# Patient Record
Sex: MALE | Race: WHITE | Employment: OTHER | URBAN - METROPOLITAN AREA
[De-identification: names, ages, dates, MRNs, and addresses within clinical notes are randomized per-mention and may not be internally consistent; named-entity substitution may affect disease eponyms.]

---

## 2017-01-11 ENCOUNTER — INITIAL CONSULT (OUTPATIENT)
Dept: PULMONOLOGY | Age: 77
End: 2017-01-11

## 2017-01-11 VITALS
SYSTOLIC BLOOD PRESSURE: 120 MMHG | BODY MASS INDEX: 20.86 KG/M2 | HEIGHT: 71 IN | HEART RATE: 62 BPM | WEIGHT: 149 LBS | OXYGEN SATURATION: 95 % | RESPIRATION RATE: 20 BRPM | DIASTOLIC BLOOD PRESSURE: 80 MMHG

## 2017-01-11 DIAGNOSIS — J45.30 MILD PERSISTENT ASTHMA WITHOUT COMPLICATION: ICD-10-CM

## 2017-01-11 LAB
DLCO %PRED: NORMAL
DLCO PRE: NORMAL
FEF 25-75%-POST: 2.61
FEF 25-75%-PRE: 2.66
FEV1-POST: 3
FEV1-PRE: 3.06
FEV1/FVC-POST: 76.8
FEV1/FVC-PRE: 76.7
FVC-POST: 3.91
FVC-PRE: 3.99
MEP: NORMAL
MIP: NORMAL
TLC %PRED: NORMAL
TLC PRE: NORMAL

## 2017-01-11 PROCEDURE — 99204 OFFICE O/P NEW MOD 45 MIN: CPT | Performed by: INTERNAL MEDICINE

## 2017-01-11 PROCEDURE — 94060 EVALUATION OF WHEEZING: CPT | Performed by: INTERNAL MEDICINE

## 2017-01-11 RX ORDER — ORPHENADRINE CITRATE 100 MG/1
TABLET, EXTENDED RELEASE ORAL
COMMUNITY
Start: 2016-11-28 | End: 2017-02-08 | Stop reason: ALTCHOICE

## 2017-01-11 RX ORDER — HYDROCODONE POLISTIREX AND CHLORPHENIRAMINE POLISTIREX 10; 8 MG/5ML; MG/5ML
SUSPENSION, EXTENDED RELEASE ORAL
COMMUNITY
Start: 2016-11-09 | End: 2018-01-04

## 2017-01-11 RX ORDER — ACETAMINOPHEN 500 MG
500 TABLET ORAL EVERY 6 HOURS PRN
COMMUNITY

## 2017-01-11 ASSESSMENT — PULMONARY FUNCTION TESTS
FVC_PRE: 3.99
FEV1_POST: 3.00
FEV1_PRE: 3.06
FEV1/FVC_PRE: 76.7
FVC_POST: 3.91
FEV1/FVC_POST: 76.8

## 2017-01-30 ENCOUNTER — HOSPITAL ENCOUNTER (OUTPATIENT)
Dept: LAB | Age: 77
Discharge: HOME | End: 2017-01-30
Attending: INTERNAL MEDICINE | Admitting: INTERNAL MEDICINE

## 2017-01-30 LAB
BASOPHILS ABSOLUTE: 0 K/CU MM
BASOPHILS RELATIVE PERCENT: 0.2 % (ref 0–1)
DIFFERENTIAL TYPE: ABNORMAL
EOSINOPHILS ABSOLUTE: 0.1 K/CU MM
EOSINOPHILS RELATIVE PERCENT: 1.1 % (ref 0–3)
FERRITIN: 554 NG/ML (ref 30–400)
HCT VFR BLD CALC: 42 % (ref 42–52)
HEMOGLOBIN: 14.3 GM/DL (ref 13.5–18)
IMMATURE NEUTROPHIL %: 0.5 % (ref 0–0.43)
LYMPHOCYTES ABSOLUTE: 1.1 K/CU MM
LYMPHOCYTES RELATIVE PERCENT: 13.2 % (ref 24–44)
MCH RBC QN AUTO: 31.4 PG (ref 27–31)
MCHC RBC AUTO-ENTMCNC: 34 % (ref 32–36)
MCV RBC AUTO: 92.1 FL (ref 78–100)
MONOCYTES ABSOLUTE: 0.7 K/CU MM
MONOCYTES RELATIVE PERCENT: 8.4 % (ref 0–4)
NUCLEATED RBC %: 0 %
PDW BLD-RTO: 13.2 % (ref 11.7–14.9)
PLATELET # BLD: 241 K/CU MM (ref 140–440)
PMV BLD AUTO: 9 FL (ref 7.5–11.1)
RBC # BLD: 4.56 M/CU MM (ref 4.6–6.2)
SEGMENTED NEUTROPHILS ABSOLUTE COUNT: 6.2 K/CU MM
SEGMENTED NEUTROPHILS RELATIVE PERCENT: 76.6 % (ref 36–66)
TOTAL IMMATURE NEUTOROPHIL: 0.04 K/CU MM
TOTAL NUCLEATED RBC: 0 K/CU MM
WBC # BLD: 8.1 K/CU MM (ref 4–10.5)

## 2017-02-01 ENCOUNTER — HOSPITAL ENCOUNTER (OUTPATIENT)
Dept: INFUSION THERAPY | Age: 77
Discharge: HOME | End: 2017-02-22
Attending: INTERNAL MEDICINE | Admitting: INTERNAL MEDICINE

## 2017-02-01 VITALS — SYSTOLIC BLOOD PRESSURE: 126 MMHG | RESPIRATION RATE: 16 BRPM | HEART RATE: 64 BPM | DIASTOLIC BLOOD PRESSURE: 67 MMHG

## 2017-02-01 RX ORDER — AMMONIA INHALANTS 0.04 G/.3ML
INHALANT RESPIRATORY (INHALATION)
Status: DISPENSED
Start: 2017-02-01 | End: 2017-02-01

## 2017-02-01 RX ORDER — ROSUVASTATIN CALCIUM 10 MG/1
10 TABLET, COATED ORAL DAILY
COMMUNITY

## 2017-02-03 ENCOUNTER — HOSPITAL ENCOUNTER (OUTPATIENT)
Dept: CT IMAGING | Age: 77
Discharge: HOME | End: 2017-02-03
Admitting: RADIOLOGY

## 2017-02-03 DIAGNOSIS — M54.50 LOW BACK PAIN: ICD-10-CM

## 2017-02-03 DIAGNOSIS — M54.40 ACUTE RIGHT-SIDED LOW BACK PAIN WITH SCIATICA, SCIATICA LATERALITY UNSPECIFIED: ICD-10-CM

## 2017-02-07 ENCOUNTER — HOSPITAL ENCOUNTER (OUTPATIENT)
Dept: LAB | Age: 77
Discharge: HOME | End: 2017-02-28
Attending: INTERNAL MEDICINE | Admitting: INTERNAL MEDICINE

## 2017-02-07 LAB
FERRITIN: 563 NG/ML (ref 30–400)
HEMOGLOBIN: 13.7 GM/DL (ref 13.5–18)

## 2017-02-08 ENCOUNTER — HOSPITAL ENCOUNTER (OUTPATIENT)
Dept: INFUSION THERAPY | Age: 77
Discharge: HOME | End: 2017-02-08
Attending: INTERNAL MEDICINE | Admitting: INTERNAL MEDICINE

## 2017-02-08 VITALS
TEMPERATURE: 97.8 F | SYSTOLIC BLOOD PRESSURE: 118 MMHG | BODY MASS INDEX: 21.7 KG/M2 | HEIGHT: 71 IN | HEART RATE: 70 BPM | OXYGEN SATURATION: 97 % | WEIGHT: 155 LBS | RESPIRATION RATE: 16 BRPM | DIASTOLIC BLOOD PRESSURE: 65 MMHG

## 2017-02-14 LAB
FERRITIN: 473 NG/ML (ref 30–400)
HEMOGLOBIN: 13.2 GM/DL (ref 13.5–18)

## 2017-02-20 LAB
FERRITIN: 322 NG/ML (ref 30–400)
HEMOGLOBIN: 11.6 GM/DL (ref 13.5–18)

## 2017-02-22 ENCOUNTER — HOSPITAL ENCOUNTER (OUTPATIENT)
Dept: INFUSION THERAPY | Age: 77
Discharge: HOME | End: 2017-02-22
Attending: INTERNAL MEDICINE | Admitting: INTERNAL MEDICINE

## 2017-02-24 ENCOUNTER — HOSPITAL ENCOUNTER (OUTPATIENT)
Dept: GENERAL RADIOLOGY | Age: 77
Discharge: HOME | End: 2017-02-24
Attending: INTERNAL MEDICINE | Admitting: INTERNAL MEDICINE

## 2017-02-24 LAB
ANION GAP SERPL CALCULATED.3IONS-SCNC: 15 MMOL/L (ref 4–16)
APTT: 37.3 SECONDS (ref 21.2–33)
BASOPHILS ABSOLUTE: 0 K/CU MM
BASOPHILS RELATIVE PERCENT: 0.3 % (ref 0–1)
BUN BLDV-MCNC: 17 MG/DL (ref 6–23)
CALCIUM SERPL-MCNC: 9.6 MG/DL (ref 8.3–10.6)
CHLORIDE BLD-SCNC: 96 MMOL/L (ref 99–110)
CO2: 27 MMOL/L (ref 21–32)
CREAT SERPL-MCNC: 1.1 MG/DL (ref 0.9–1.3)
DIFFERENTIAL TYPE: ABNORMAL
EOSINOPHILS ABSOLUTE: 0.1 K/CU MM
EOSINOPHILS RELATIVE PERCENT: 1.6 % (ref 0–3)
GFR AFRICAN AMERICAN: >60 ML/MIN/1.73M2
GFR NON-AFRICAN AMERICAN: >60 ML/MIN/1.73M2
GLUCOSE BLD-MCNC: 86 MG/DL (ref 70–140)
HCT VFR BLD CALC: 38 % (ref 42–52)
HEMOGLOBIN: 12.1 GM/DL (ref 13.5–18)
IMMATURE NEUTROPHIL %: 0.4 % (ref 0–0.43)
INR BLD: 1.49 INDEX
LYMPHOCYTES ABSOLUTE: 0.9 K/CU MM
LYMPHOCYTES RELATIVE PERCENT: 12.6 % (ref 24–44)
MCH RBC QN AUTO: 31 PG (ref 27–31)
MCHC RBC AUTO-ENTMCNC: 31.8 % (ref 32–36)
MCV RBC AUTO: 97.4 FL (ref 78–100)
MONOCYTES ABSOLUTE: 0.7 K/CU MM
MONOCYTES RELATIVE PERCENT: 10.1 % (ref 0–4)
NUCLEATED RBC %: 0 %
PDW BLD-RTO: 13.4 % (ref 11.7–14.9)
PLATELET # BLD: 255 K/CU MM (ref 140–440)
PMV BLD AUTO: 9.3 FL (ref 7.5–11.1)
POTASSIUM SERPL-SCNC: 3.4 MMOL/L (ref 3.5–5.1)
PROTHROMBIN TIME: 17.2 SECONDS (ref 9.12–12.5)
RBC # BLD: 3.9 M/CU MM (ref 4.6–6.2)
SEGMENTED NEUTROPHILS ABSOLUTE COUNT: 5.1 K/CU MM
SEGMENTED NEUTROPHILS RELATIVE PERCENT: 75 % (ref 36–66)
SODIUM BLD-SCNC: 138 MMOL/L (ref 135–145)
TOTAL IMMATURE NEUTOROPHIL: 0.03 K/CU MM
TOTAL NUCLEATED RBC: 0 K/CU MM
WBC # BLD: 6.8 K/CU MM (ref 4–10.5)

## 2017-02-28 PROBLEM — R00.1 BRADYCARDIA: Status: ACTIVE | Noted: 2017-02-28

## 2017-02-28 PROBLEM — I20.9 ISCHEMIC CHEST PAIN (HCC): Status: ACTIVE | Noted: 2017-02-28

## 2017-03-01 ENCOUNTER — HOSPITAL ENCOUNTER (OUTPATIENT)
Dept: LAB | Age: 77
Discharge: HOME | End: 2017-03-31
Attending: INTERNAL MEDICINE | Admitting: INTERNAL MEDICINE

## 2017-03-08 ENCOUNTER — HOSPITAL ENCOUNTER (OUTPATIENT)
Dept: INFUSION THERAPY | Age: 77
Discharge: HOME | End: 2017-04-06
Attending: INTERNAL MEDICINE | Admitting: INTERNAL MEDICINE

## 2017-03-24 ENCOUNTER — HOSPITAL ENCOUNTER (OUTPATIENT)
Dept: OTHER | Age: 77
Discharge: HOME | End: 2017-03-31
Attending: FAMILY MEDICINE | Admitting: INTERNAL MEDICINE

## 2017-04-01 ENCOUNTER — HOSPITAL ENCOUNTER (OUTPATIENT)
Dept: OTHER | Age: 77
Discharge: HOME | End: 2017-04-30
Attending: FAMILY MEDICINE | Admitting: INTERNAL MEDICINE

## 2017-05-01 ENCOUNTER — HOSPITAL ENCOUNTER (OUTPATIENT)
Dept: OTHER | Age: 77
Discharge: HOME | End: 2017-05-31
Attending: FAMILY MEDICINE | Admitting: INTERNAL MEDICINE

## 2017-05-23 ENCOUNTER — TELEPHONE (OUTPATIENT)
Dept: GASTROENTEROLOGY | Age: 77
End: 2017-05-23

## 2017-05-24 RX ORDER — RANITIDINE 150 MG/1
300 TABLET ORAL NIGHTLY
Qty: 180 TABLET | Refills: 3 | Status: SHIPPED | OUTPATIENT
Start: 2017-05-24

## 2017-06-05 ENCOUNTER — HOSPITAL ENCOUNTER (OUTPATIENT)
Dept: OTHER | Age: 77
Discharge: HOME | End: 2017-06-30
Attending: INTERNAL MEDICINE | Admitting: INTERNAL MEDICINE

## 2017-07-01 ENCOUNTER — HOSPITAL ENCOUNTER (OUTPATIENT)
Dept: OTHER | Age: 77
Discharge: HOME | End: 2017-07-31
Attending: INTERNAL MEDICINE | Admitting: INTERNAL MEDICINE

## 2017-07-17 ENCOUNTER — OFFICE VISIT (OUTPATIENT)
Dept: PULMONOLOGY | Age: 77
End: 2017-07-17

## 2017-07-17 VITALS
WEIGHT: 154 LBS | RESPIRATION RATE: 18 BRPM | SYSTOLIC BLOOD PRESSURE: 126 MMHG | OXYGEN SATURATION: 98 % | BODY MASS INDEX: 21.56 KG/M2 | HEART RATE: 67 BPM | HEIGHT: 71 IN | DIASTOLIC BLOOD PRESSURE: 80 MMHG

## 2017-07-17 DIAGNOSIS — J45.30 MILD PERSISTENT ASTHMA WITHOUT COMPLICATION: Primary | ICD-10-CM

## 2017-07-17 PROCEDURE — 99213 OFFICE O/P EST LOW 20 MIN: CPT | Performed by: INTERNAL MEDICINE

## 2017-07-17 PROCEDURE — G8420 CALC BMI NORM PARAMETERS: HCPCS | Performed by: INTERNAL MEDICINE

## 2017-07-17 PROCEDURE — 4040F PNEUMOC VAC/ADMIN/RCVD: CPT | Performed by: INTERNAL MEDICINE

## 2017-07-17 PROCEDURE — 1036F TOBACCO NON-USER: CPT | Performed by: INTERNAL MEDICINE

## 2017-07-17 PROCEDURE — G8427 DOCREV CUR MEDS BY ELIG CLIN: HCPCS | Performed by: INTERNAL MEDICINE

## 2017-07-17 PROCEDURE — 1123F ACP DISCUSS/DSCN MKR DOCD: CPT | Performed by: INTERNAL MEDICINE

## 2017-07-17 RX ORDER — ALBUTEROL SULFATE 90 UG/1
2 AEROSOL, METERED RESPIRATORY (INHALATION) EVERY 6 HOURS PRN
COMMUNITY
End: 2019-01-15

## 2017-08-01 ENCOUNTER — HOSPITAL ENCOUNTER (OUTPATIENT)
Dept: OTHER | Age: 77
Discharge: HOME | End: 2017-08-31
Attending: INTERNAL MEDICINE | Admitting: INTERNAL MEDICINE

## 2018-01-04 ENCOUNTER — HOSPITAL ENCOUNTER (OUTPATIENT)
Dept: GENERAL RADIOLOGY | Age: 78
Discharge: HOME | End: 2018-01-04
Attending: INTERNAL MEDICINE | Admitting: INTERNAL MEDICINE

## 2018-01-04 ENCOUNTER — OFFICE VISIT (OUTPATIENT)
Dept: PULMONOLOGY | Age: 78
End: 2018-01-04

## 2018-01-04 VITALS
WEIGHT: 154 LBS | SYSTOLIC BLOOD PRESSURE: 108 MMHG | DIASTOLIC BLOOD PRESSURE: 70 MMHG | HEART RATE: 64 BPM | OXYGEN SATURATION: 90 % | HEIGHT: 71 IN | BODY MASS INDEX: 21.56 KG/M2

## 2018-01-04 DIAGNOSIS — R06.02 SHORTNESS OF BREATH: ICD-10-CM

## 2018-01-04 DIAGNOSIS — J45.909 ACUTE ASTHMATIC BRONCHITIS: Primary | ICD-10-CM

## 2018-01-04 DIAGNOSIS — J45.30 MILD PERSISTENT ASTHMA WITHOUT COMPLICATION: ICD-10-CM

## 2018-01-04 DIAGNOSIS — J45.909 ACUTE ASTHMATIC BRONCHITIS: ICD-10-CM

## 2018-01-04 PROCEDURE — 1123F ACP DISCUSS/DSCN MKR DOCD: CPT | Performed by: INTERNAL MEDICINE

## 2018-01-04 PROCEDURE — G8598 ASA/ANTIPLAT THER USED: HCPCS | Performed by: INTERNAL MEDICINE

## 2018-01-04 PROCEDURE — 4040F PNEUMOC VAC/ADMIN/RCVD: CPT | Performed by: INTERNAL MEDICINE

## 2018-01-04 PROCEDURE — 1036F TOBACCO NON-USER: CPT | Performed by: INTERNAL MEDICINE

## 2018-01-04 PROCEDURE — G8427 DOCREV CUR MEDS BY ELIG CLIN: HCPCS | Performed by: INTERNAL MEDICINE

## 2018-01-04 PROCEDURE — G8484 FLU IMMUNIZE NO ADMIN: HCPCS | Performed by: INTERNAL MEDICINE

## 2018-01-04 PROCEDURE — 99213 OFFICE O/P EST LOW 20 MIN: CPT | Performed by: INTERNAL MEDICINE

## 2018-01-04 PROCEDURE — G8420 CALC BMI NORM PARAMETERS: HCPCS | Performed by: INTERNAL MEDICINE

## 2018-01-04 RX ORDER — ALBUTEROL SULFATE 90 UG/1
2 AEROSOL, METERED RESPIRATORY (INHALATION) EVERY 6 HOURS PRN
Qty: 1 INHALER | Refills: 11 | Status: SHIPPED | OUTPATIENT
Start: 2018-01-04 | End: 2020-02-17 | Stop reason: SDUPTHER

## 2018-01-04 RX ORDER — PREDNISONE 1 MG/1
TABLET ORAL
Qty: 38 TABLET | Refills: 0 | Status: SHIPPED | OUTPATIENT
Start: 2018-01-04 | End: 2018-06-04 | Stop reason: ALTCHOICE

## 2018-01-04 RX ORDER — DOXYCYCLINE HYCLATE 100 MG
100 TABLET ORAL 2 TIMES DAILY
Qty: 14 TABLET | Refills: 0 | Status: SHIPPED | OUTPATIENT
Start: 2018-01-04 | End: 2018-01-11

## 2018-01-04 NOTE — PROGRESS NOTES
SUBJECTIVE:  Chief complaintacute asthmatic bronchitis with worsening shortness of breath and history of mild persistent asthma   Miriam Frye states that about a month ago he noted the onset of increasing shortness of breath with chest congestion and cough. Over the past several weeks she's had some sputum expectoration. He denies fever chills. He continues on Singulair and albuterol to help control his mild persistent asthma. He is had extensive intervention concerning his coronary artery disease with stent placement. He also appears to have some aspect of valvular heart disease but denies being in congestive heart failure in the past    OBJECTIVE:  /70   Pulse 64   Ht 5' 10.98\" (1.803 m)   Wt 154 lb (69.9 kg)   SpO2 90%   BMI 21.49 kg/m²      Physical Exam:  Constitutional:  He appears well developed and well-nourished. Neck:  Supple, No palpable lymphadenopathy, No JVD  Cardiovascular:  S1, S2 Normal, Regular rhythm, easily heard murmur along the left sternal border,No gallop,No pericardial  rubs. Pulmonary:  Sounds reveal scattered posterior basilar rales with some mild wheezing  Abdomen: No epigastric pain, No distention. No organomegaly   Extremities: no edema, No DVT  Neurologic:  Awake and Alert, No focal deficits    Radiology: Last chest x-ray at [address] [place] [address],[address] on 11/14/16 showed no active cardiopulmonary is with hyperinflation and cardiomegaly  PFT: Office spirometry 1/11/17 demonstrated no significant airways obstruction and no significant response to bronchodilators        ASSESSMENT:    1. Acute asthmatic bronchitis    2. Shortness of breath    3. Mild persistent asthma without complication          PLAN:  I'm going to start him on a tapering course of oral prednisone, doxycycline 100 mg twice a day, and have given him 2  Breo ellipta 100/25 one inhalation daily. I would like to get a chest x-ray. He may need follow-up cardiac evaluation.  I will plan on repeating his pulmonary function tests in the

## 2018-01-10 ENCOUNTER — TELEPHONE (OUTPATIENT)
Dept: PULMONOLOGY | Age: 78
End: 2018-01-10

## 2018-01-10 NOTE — TELEPHONE ENCOUNTER
Pt walked into office stating he was unable to take sample of Breo due to heart palpations. Cardiologist told pt to DC spoke with Dr Adrian Guzman he agreed.  Gave pt sample of Spiriva Respimat to try 2 inhalations one time daily

## 2018-01-15 ENCOUNTER — NURSE ONLY (OUTPATIENT)
Dept: PULMONOLOGY | Age: 78
End: 2018-01-15

## 2018-01-15 DIAGNOSIS — R06.02 SHORTNESS OF BREATH: ICD-10-CM

## 2018-01-15 DIAGNOSIS — J45.30 MILD PERSISTENT ASTHMA WITHOUT COMPLICATION: ICD-10-CM

## 2018-01-15 LAB
DLCO %PRED: NORMAL
DLCO PRE: NORMAL
FEF 25-75%-POST: 3.13
FEF 25-75%-PRE: 2.71
FEV1-POST: 3.06
FEV1-PRE: 2.93
FEV1/FVC-POST: 79
FEV1/FVC-PRE: 77.2
FVC-POST: 3.88
FVC-PRE: 3.79
MEP: NORMAL
MIP: NORMAL
TLC %PRED: NORMAL
TLC PRE: NORMAL

## 2018-01-15 PROCEDURE — 94060 EVALUATION OF WHEEZING: CPT | Performed by: INTERNAL MEDICINE

## 2018-01-15 ASSESSMENT — PULMONARY FUNCTION TESTS
FVC_PRE: 3.79
FEV1/FVC_PRE: 77.2
FEV1_POST: 3.06
FEV1/FVC_POST: 79.0
FVC_POST: 3.88
FEV1_PRE: 2.93

## 2018-02-12 ENCOUNTER — HOSPITAL ENCOUNTER (OUTPATIENT)
Dept: GENERAL RADIOLOGY | Age: 78
Discharge: HOME | End: 2018-02-12
Attending: PHYSICIAN ASSISTANT | Admitting: PHYSICIAN ASSISTANT

## 2018-02-12 DIAGNOSIS — R07.81 PAINFUL RIB: ICD-10-CM

## 2018-02-12 DIAGNOSIS — M54.6 THORACIC BACK PAIN, UNSPECIFIED BACK PAIN LATERALITY, UNSPECIFIED CHRONICITY: ICD-10-CM

## 2018-03-02 ENCOUNTER — HOSPITAL ENCOUNTER (OUTPATIENT)
Dept: GENERAL RADIOLOGY | Age: 78
Discharge: HOME | End: 2018-03-02
Attending: PHYSICIAN ASSISTANT | Admitting: PHYSICIAN ASSISTANT

## 2018-03-02 DIAGNOSIS — R07.81 RIB PAIN ON LEFT SIDE: ICD-10-CM

## 2018-03-12 ENCOUNTER — HOSPITAL ENCOUNTER (OUTPATIENT)
Dept: WOMENS IMAGING | Age: 78
Discharge: HOME | End: 2018-03-12
Attending: FAMILY MEDICINE | Admitting: FAMILY MEDICINE

## 2018-03-12 DIAGNOSIS — S22.32XD OPEN FRACTURE OF ONE RIB OF LEFT SIDE WITH ROUTINE HEALING, SUBSEQUENT ENCOUNTER: ICD-10-CM

## 2018-06-05 PROBLEM — K40.90 LEFT INGUINAL HERNIA: Status: ACTIVE | Noted: 2018-06-05

## 2018-06-29 PROBLEM — Z09 STATUS POST LEFT INGUINAL HERNIA REPAIR, FOLLOW-UP EXAM: Status: ACTIVE | Noted: 2018-06-29

## 2018-07-17 ENCOUNTER — OFFICE VISIT (OUTPATIENT)
Dept: PULMONOLOGY | Age: 78
End: 2018-07-17

## 2018-07-17 VITALS
HEART RATE: 69 BPM | WEIGHT: 155 LBS | DIASTOLIC BLOOD PRESSURE: 70 MMHG | SYSTOLIC BLOOD PRESSURE: 116 MMHG | BODY MASS INDEX: 21.7 KG/M2 | OXYGEN SATURATION: 98 % | HEIGHT: 71 IN

## 2018-07-17 DIAGNOSIS — J45.30 MILD PERSISTENT ASTHMA WITHOUT COMPLICATION: Primary | ICD-10-CM

## 2018-07-17 PROCEDURE — 1123F ACP DISCUSS/DSCN MKR DOCD: CPT | Performed by: INTERNAL MEDICINE

## 2018-07-17 PROCEDURE — 1036F TOBACCO NON-USER: CPT | Performed by: INTERNAL MEDICINE

## 2018-07-17 PROCEDURE — G8420 CALC BMI NORM PARAMETERS: HCPCS | Performed by: INTERNAL MEDICINE

## 2018-07-17 PROCEDURE — 4040F PNEUMOC VAC/ADMIN/RCVD: CPT | Performed by: INTERNAL MEDICINE

## 2018-07-17 PROCEDURE — G8427 DOCREV CUR MEDS BY ELIG CLIN: HCPCS | Performed by: INTERNAL MEDICINE

## 2018-07-17 PROCEDURE — 99213 OFFICE O/P EST LOW 20 MIN: CPT | Performed by: INTERNAL MEDICINE

## 2018-07-17 PROCEDURE — 1101F PT FALLS ASSESS-DOCD LE1/YR: CPT | Performed by: INTERNAL MEDICINE

## 2018-07-17 PROCEDURE — G8598 ASA/ANTIPLAT THER USED: HCPCS | Performed by: INTERNAL MEDICINE

## 2018-07-17 RX ORDER — ALBUTEROL SULFATE 90 UG/1
2 AEROSOL, METERED RESPIRATORY (INHALATION) EVERY 6 HOURS PRN
Qty: 3 INHALER | Refills: 3 | Status: SHIPPED | OUTPATIENT
Start: 2018-07-17 | End: 2019-01-15

## 2018-07-29 PROBLEM — Z09 STATUS POST LEFT INGUINAL HERNIA REPAIR, FOLLOW-UP EXAM: Status: RESOLVED | Noted: 2018-06-29 | Resolved: 2018-07-29

## 2019-01-15 ENCOUNTER — OFFICE VISIT (OUTPATIENT)
Dept: PULMONOLOGY | Age: 79
End: 2019-01-15

## 2019-01-15 VITALS
BODY MASS INDEX: 21.42 KG/M2 | SYSTOLIC BLOOD PRESSURE: 112 MMHG | HEART RATE: 74 BPM | DIASTOLIC BLOOD PRESSURE: 64 MMHG | WEIGHT: 153 LBS | HEIGHT: 71 IN | OXYGEN SATURATION: 98 %

## 2019-01-15 DIAGNOSIS — J45.30 MILD PERSISTENT ASTHMA WITHOUT COMPLICATION: Primary | ICD-10-CM

## 2019-01-15 PROCEDURE — 99213 OFFICE O/P EST LOW 20 MIN: CPT | Performed by: INTERNAL MEDICINE

## 2019-02-19 ENCOUNTER — NURSE ONLY (OUTPATIENT)
Dept: PULMONOLOGY | Age: 79
End: 2019-02-19

## 2019-02-19 DIAGNOSIS — J45.30 MILD PERSISTENT ASTHMA WITHOUT COMPLICATION: ICD-10-CM

## 2019-02-19 DIAGNOSIS — J45.30 MILD PERSISTENT ASTHMA WITHOUT COMPLICATION: Primary | ICD-10-CM

## 2019-02-19 LAB
EXPIRATORY TIME-POST: NORMAL SEC
EXPIRATORY TIME: NORMAL SEC
FEF 25-75% %CHNG: NORMAL
FEF 25-75% %PRED-POST: NORMAL %
FEF 25-75% %PRED-PRE: NORMAL L/SEC
FEF 25-75% PRED: NORMAL L/SEC
FEF 25-75%-POST: NORMAL L/SEC
FEF 25-75%-PRE: NORMAL L/SEC
FEV1 %PRED-POST: 95 %
FEV1 %PRED-PRE: 100 %
FEV1 PRED: 3.02 L
FEV1-POST: 2.88 L
FEV1-PRE: 3 L
FEV1/FVC %PRED-POST: 114 %
FEV1/FVC %PRED-PRE: 112 %
FEV1/FVC PRED: 71.7 %
FEV1/FVC-POST: 81.5 %
FEV1/FVC-PRE: 80.2 %
FVC %PRED-POST: 84 L
FVC %PRED-PRE: 89 %
FVC PRED: 4.22 L
FVC-POST: 3.53 L
FVC-PRE: 3.75 L
PEF %PRED-POST: NORMAL %
PEF %PRED-PRE: NORMAL L/SEC
PEF PRED: NORMAL L/SEC
PEF%CHNG: NORMAL
PEF-POST: NORMAL L/SEC
PEF-PRE: NORMAL L/SEC

## 2019-02-19 PROCEDURE — 94060 EVALUATION OF WHEEZING: CPT | Performed by: INTERNAL MEDICINE

## 2019-02-19 PROCEDURE — 99999 PR OFFICE/OUTPT VISIT,PROCEDURE ONLY: CPT | Performed by: INTERNAL MEDICINE

## 2019-02-19 ASSESSMENT — PULMONARY FUNCTION TESTS
FEV1/FVC_PREDICTED: 71.7
FEV1_PERCENT_PREDICTED_PRE: 100
FEV1/FVC_PERCENT_PREDICTED_PRE: 112
FEV1_PRE: 3.00
FEV1_PREDICTED: 3.02
FEV1/FVC_PERCENT_PREDICTED_POST: 114
FEV1/FVC_PRE: 80.2
FEV1/FVC_POST: 81.5
FEV1_POST: 2.88
FVC_POST: 3.53
FEV1_PERCENT_PREDICTED_POST: 95
FVC_PRE: 3.75
FVC_PERCENT_PREDICTED_PRE: 89
FVC_PREDICTED: 4.22
FVC_PERCENT_PREDICTED_POST: 84

## 2019-07-16 ENCOUNTER — OFFICE VISIT (OUTPATIENT)
Dept: PULMONOLOGY | Age: 79
End: 2019-07-16
Payer: MEDICARE

## 2019-07-16 VITALS
HEIGHT: 71 IN | WEIGHT: 153.6 LBS | HEART RATE: 68 BPM | SYSTOLIC BLOOD PRESSURE: 108 MMHG | DIASTOLIC BLOOD PRESSURE: 52 MMHG | OXYGEN SATURATION: 98 % | BODY MASS INDEX: 21.5 KG/M2

## 2019-07-16 DIAGNOSIS — J45.30 MILD PERSISTENT ASTHMA WITHOUT COMPLICATION: Primary | ICD-10-CM

## 2019-07-16 PROCEDURE — G8427 DOCREV CUR MEDS BY ELIG CLIN: HCPCS | Performed by: INTERNAL MEDICINE

## 2019-07-16 PROCEDURE — G8598 ASA/ANTIPLAT THER USED: HCPCS | Performed by: INTERNAL MEDICINE

## 2019-07-16 PROCEDURE — 1123F ACP DISCUSS/DSCN MKR DOCD: CPT | Performed by: INTERNAL MEDICINE

## 2019-07-16 PROCEDURE — 1036F TOBACCO NON-USER: CPT | Performed by: INTERNAL MEDICINE

## 2019-07-16 PROCEDURE — 4040F PNEUMOC VAC/ADMIN/RCVD: CPT | Performed by: INTERNAL MEDICINE

## 2019-07-16 PROCEDURE — G8420 CALC BMI NORM PARAMETERS: HCPCS | Performed by: INTERNAL MEDICINE

## 2019-07-16 PROCEDURE — 99213 OFFICE O/P EST LOW 20 MIN: CPT | Performed by: INTERNAL MEDICINE

## 2019-07-16 RX ORDER — ALBUTEROL SULFATE 90 UG/1
2 AEROSOL, METERED RESPIRATORY (INHALATION) EVERY 6 HOURS PRN
Qty: 1 INHALER | Refills: 11 | Status: CANCELLED | OUTPATIENT
Start: 2019-07-16

## 2019-07-16 RX ORDER — ALBUTEROL SULFATE 90 UG/1
2 AEROSOL, METERED RESPIRATORY (INHALATION) EVERY 6 HOURS PRN
Qty: 3 INHALER | Refills: 3 | Status: SHIPPED | OUTPATIENT
Start: 2019-07-16 | End: 2021-06-16 | Stop reason: SDUPTHER

## 2020-01-28 ENCOUNTER — HOSPITAL ENCOUNTER (OUTPATIENT)
Dept: PHYSICAL THERAPY | Age: 80
Discharge: HOME | End: 2020-01-28

## 2020-01-28 PROCEDURE — 9990000042 HC WELLNESS CENTER INDV PER MONTH

## 2020-02-17 ENCOUNTER — OFFICE VISIT (OUTPATIENT)
Dept: PULMONOLOGY | Age: 80
End: 2020-02-17
Payer: MEDICARE

## 2020-02-17 VITALS
OXYGEN SATURATION: 100 % | HEART RATE: 74 BPM | HEIGHT: 72 IN | DIASTOLIC BLOOD PRESSURE: 64 MMHG | BODY MASS INDEX: 20.59 KG/M2 | WEIGHT: 152 LBS | SYSTOLIC BLOOD PRESSURE: 118 MMHG

## 2020-02-17 PROCEDURE — G8427 DOCREV CUR MEDS BY ELIG CLIN: HCPCS | Performed by: INTERNAL MEDICINE

## 2020-02-17 PROCEDURE — 1123F ACP DISCUSS/DSCN MKR DOCD: CPT | Performed by: INTERNAL MEDICINE

## 2020-02-17 PROCEDURE — G8484 FLU IMMUNIZE NO ADMIN: HCPCS | Performed by: INTERNAL MEDICINE

## 2020-02-17 PROCEDURE — G8420 CALC BMI NORM PARAMETERS: HCPCS | Performed by: INTERNAL MEDICINE

## 2020-02-17 PROCEDURE — 99213 OFFICE O/P EST LOW 20 MIN: CPT | Performed by: INTERNAL MEDICINE

## 2020-02-17 PROCEDURE — 1036F TOBACCO NON-USER: CPT | Performed by: INTERNAL MEDICINE

## 2020-02-17 PROCEDURE — 4040F PNEUMOC VAC/ADMIN/RCVD: CPT | Performed by: INTERNAL MEDICINE

## 2020-02-17 RX ORDER — ALBUTEROL SULFATE 90 UG/1
2 AEROSOL, METERED RESPIRATORY (INHALATION) EVERY 6 HOURS PRN
Qty: 3 INHALER | Refills: 3 | Status: SHIPPED | OUTPATIENT
Start: 2020-02-17 | End: 2022-05-24 | Stop reason: SDUPTHER

## 2020-03-02 ENCOUNTER — NURSE ONLY (OUTPATIENT)
Dept: PULMONOLOGY | Age: 80
End: 2020-03-02
Payer: MEDICARE

## 2020-03-02 LAB
EXPIRATORY TIME-POST: NORMAL
EXPIRATORY TIME: NORMAL
FEF 25-75% %CHNG: NORMAL
FEF 25-75% %PRED-POST: NORMAL
FEF 25-75% %PRED-PRE: NORMAL
FEF 25-75% PRED: NORMAL
FEF 25-75%-POST: NORMAL
FEF 25-75%-PRE: NORMAL
FEV1 %PRED-POST: 90 %
FEV1 %PRED-PRE: 94.9 %
FEV1 PRED: 3.12 L
FEV1-POST: 2.81 L
FEV1-PRE: 2.97 L
FEV1/FVC %PRED-POST: 114.1 %
FEV1/FVC %PRED-PRE: 114.5 %
FEV1/FVC PRED: 71.5 %
FEV1/FVC-POST: 81.6 %
FEV1/FVC-PRE: 81.9 %
FVC %PRED-POST: 78.9 L
FVC %PRED-PRE: 82.9 %
FVC PRED: 4.37 L
FVC-POST: 3.45 L
FVC-PRE: 3.62 L
PEF %PRED-POST: NORMAL
PEF %PRED-PRE: NORMAL
PEF PRED: NORMAL
PEF%CHNG: NORMAL
PEF-POST: NORMAL
PEF-PRE: NORMAL

## 2020-03-02 PROCEDURE — 94060 EVALUATION OF WHEEZING: CPT | Performed by: INTERNAL MEDICINE

## 2020-03-02 ASSESSMENT — PULMONARY FUNCTION TESTS
FEV1/FVC_PREDICTED: 71.5
FVC_PERCENT_PREDICTED_POST: 78.9
FVC_POST: 3.45
FEV1_PERCENT_PREDICTED_POST: 90.0
FEV1_PREDICTED: 3.12
FEV1_PRE: 2.97
FEV1/FVC_PERCENT_PREDICTED_PRE: 114.5
FEV1_POST: 2.81
FEV1/FVC_PRE: 81.9
FVC_PREDICTED: 4.37
FEV1/FVC_PERCENT_PREDICTED_POST: 114.1
FVC_PRE: 3.62
FEV1_PERCENT_PREDICTED_PRE: 94.9
FEV1/FVC_POST: 81.6
FVC_PERCENT_PREDICTED_PRE: 82.9

## 2020-03-02 NOTE — PROGRESS NOTES
Deborah Hook came to office for a PFT. The patients chief complaint is mild persistent asthma. He demonstrates an FEV1 of 2.97 L with an FVC of 3.62 liters. He demonstrates no significant obstructive lung defect. He shows no significant response to bronchodilators. Overall, his lung function has remained stable over the past year. I will make no changes to his current bronchodilator therapy. These results were discussed with him directly.   All questions answered

## 2020-09-14 ENCOUNTER — TELEPHONE (OUTPATIENT)
Dept: PULMONOLOGY | Age: 80
End: 2020-09-14

## 2020-09-25 ENCOUNTER — OFFICE VISIT (OUTPATIENT)
Dept: PULMONOLOGY | Age: 80
End: 2020-09-25
Payer: MEDICARE

## 2020-09-25 VITALS
OXYGEN SATURATION: 98 % | SYSTOLIC BLOOD PRESSURE: 128 MMHG | BODY MASS INDEX: 19.77 KG/M2 | WEIGHT: 146 LBS | HEIGHT: 72 IN | HEART RATE: 71 BPM | DIASTOLIC BLOOD PRESSURE: 62 MMHG

## 2020-09-25 PROCEDURE — G8427 DOCREV CUR MEDS BY ELIG CLIN: HCPCS | Performed by: INTERNAL MEDICINE

## 2020-09-25 PROCEDURE — 1036F TOBACCO NON-USER: CPT | Performed by: INTERNAL MEDICINE

## 2020-09-25 PROCEDURE — G8420 CALC BMI NORM PARAMETERS: HCPCS | Performed by: INTERNAL MEDICINE

## 2020-09-25 PROCEDURE — 99213 OFFICE O/P EST LOW 20 MIN: CPT | Performed by: INTERNAL MEDICINE

## 2020-09-25 PROCEDURE — 4040F PNEUMOC VAC/ADMIN/RCVD: CPT | Performed by: INTERNAL MEDICINE

## 2020-09-25 PROCEDURE — 1123F ACP DISCUSS/DSCN MKR DOCD: CPT | Performed by: INTERNAL MEDICINE

## 2020-09-25 RX ORDER — ALBUTEROL SULFATE 90 UG/1
2 AEROSOL, METERED RESPIRATORY (INHALATION) EVERY 6 HOURS PRN
Qty: 3 INHALER | Refills: 3 | Status: SHIPPED | OUTPATIENT
Start: 2020-09-25

## 2020-09-28 ENCOUNTER — HOSPITAL ENCOUNTER (OUTPATIENT)
Age: 80
Discharge: HOME | End: 2020-09-28
Payer: MEDICARE

## 2020-09-28 ENCOUNTER — HOSPITAL ENCOUNTER (OUTPATIENT)
Dept: GENERAL RADIOLOGY | Age: 80
Discharge: HOME | End: 2020-09-28
Payer: MEDICARE

## 2020-09-28 PROCEDURE — 71046 X-RAY EXAM CHEST 2 VIEWS: CPT

## 2021-01-05 ENCOUNTER — HOSPITAL ENCOUNTER (OUTPATIENT)
Dept: PHYSICAL THERAPY | Age: 81
Discharge: HOME | End: 2021-01-05

## 2021-01-05 PROCEDURE — 9990000042 HC WELLNESS CENTER INDV PER MONTH

## 2021-02-01 ENCOUNTER — HOSPITAL ENCOUNTER (OUTPATIENT)
Dept: PHYSICAL THERAPY | Age: 81
Discharge: HOME | End: 2021-02-01

## 2021-02-01 PROCEDURE — 9990000042 HC WELLNESS CENTER INDV PER MONTH

## 2021-03-01 ENCOUNTER — HOSPITAL ENCOUNTER (OUTPATIENT)
Dept: PHYSICAL THERAPY | Age: 81
Discharge: HOME | End: 2021-03-01

## 2021-03-01 PROCEDURE — 9990000042 HC WELLNESS CENTER INDV PER MONTH

## 2021-03-26 ENCOUNTER — HOSPITAL ENCOUNTER (OUTPATIENT)
Dept: PHYSICAL THERAPY | Age: 81
Setting detail: THERAPIES SERIES
Discharge: HOME | End: 2021-03-26
Payer: MEDICARE

## 2021-03-26 PROCEDURE — 97140 MANUAL THERAPY 1/> REGIONS: CPT

## 2021-03-26 PROCEDURE — 97162 PT EVAL MOD COMPLEX 30 MIN: CPT

## 2021-03-26 PROCEDURE — 97535 SELF CARE MNGMENT TRAINING: CPT

## 2021-03-26 ASSESSMENT — PAIN DESCRIPTION - PAIN TYPE: TYPE: CHRONIC PAIN

## 2021-03-26 ASSESSMENT — PAIN DESCRIPTION - INTENSITY: RATING_2: 3

## 2021-03-26 ASSESSMENT — PAIN DESCRIPTION - LOCATION: LOCATION: SHOULDER

## 2021-03-26 ASSESSMENT — PAIN - FUNCTIONAL ASSESSMENT: PAIN_FUNCTIONAL_ASSESSMENT: ACTIVITIES ARE NOT PREVENTED

## 2021-03-26 NOTE — PLAN OF CARE
Outpatient Physical Therapy        [] Phone: [phone]   Fax: [phone]   Pediatric Therapy          [] Phone: [phone]   Fax: [phone]  [facility]          [] Phone: [phone]   Fax: [phone]      To: Referring Practitioner: Allison Mo    From: Mita Joaquin PT     Patient: Poonam Delgadillo       : 1940  Diagnosis: Chronic LBP w/ sciatica laterally, idiopathic scoliosis, chronic right shoulder/arm pain   Treatment Diagnosis: LBP w/forward bending and gait, right shoulder pain w weakness and impaired ROM/functional use, tight hip flexors, ITB, hamstrings   Date: 3/26/2021    Physical Therapy Certification/Re-Certification Form  Dear Dr. Kanu Elkins,    The following patient has been evaluated for physical therapy services and for therapy to continue, Please review the attached evaluation and/or summary of the patient's plan of care, and verify that you agree therapy should continue by signing the attached document and sending it back to our office. Assessment:  Pt is an 79 y.o. right hand dominant male w/DXChronic LBP w/ sciatica laterally, idiopathic scoliosis, chronic right shoulder/arm pain. Pt reports chronic right shoulder and back pain. 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 Also, reports back and right > left hip pain x many years as well as idiopathic scoliosis. Has been wearing an abdominal binder x at least 3 years w/good result. Wears the binder if he is going to be out and walking a long time. PLOF:  Ind. w/mobility and self-care, works out at [employer]. Due to SVT's, unable to walk on treadmill. Otherwise, is weight lifting and riding the bike w/o problem. Worse/Better:  Shoulder:  Raising RUE overhead, lifting./rest and not raising arm above shoulder height.   Back:  Walking distances/sitting  Treatment Diagnosis: LBP w/forward bending and gait, right shoulder pain w weakness and impaired ROM/functional use, tight hip flexors, ITB, hamstrings    Planned Services:  [x] Therapeutic Exercise    [] Aquatics:  [x] Therapeutic Activity    [] Ultrasound  [] Elec Stimulation  [] Gait Training     [] Cervical Traction [] Lumbar Traction  [x] Neuromuscular Re-education [x] Cold/hotpack [] Iontophoresis   [x] Instruction in HEP       [x] Manual Therapy     [x] vasopneumatic            [x] Self care home management        []Dry needling trigger point point/pain management      Plan of Care Date Range:   3/26/21 - 4/23/21    ? Frequency/Duration:  # Days per week: [] 1 day # Weeks: [] 1 week [] 5 weeks     [x] 2 days? [] 2 weeks [] 6 weeks     [] 3 days   [] 3 weeks [] 7 weeks     [] 4 days   [x] 4 weeks [] 8 weeks    Rehab Potential: [] Excellent [x] Good [] Fair  [] Poor     Goals:     Long term goals  Time Frame for Long term goals : In 4 weeks, patient will  Long term goal 1: demonstrate compliance and independence w/HEP. Long term goal 2: score <= 10% disability on OSW LBPQ as indication of improved function w/daily activities. Long term goal 3: score <= 20% disability on Quick Dash as indication of improved function w/daily activities  Long term goal 4: demonstrate ability to raise right shoulder at least 110 deg w/10# weight in hand and place weight on shelf. Electronically signed by:  Susan Tse PT, 3/26/2021, 5:42 PM          If you have any questions or concerns, please don't hesitate to call.   Thank you for your referral.    Physician Signature:_________________Date:____________Time: ________  By signing above, therapists plan is approved by physician

## 2021-03-26 NOTE — FLOWSHEET NOTE
Outpatient Physical Therapy  [place]           [x] Phone: [phone]   Fax: [phone]  [facility]           [] Phone: [phone]   Fax: [phone]        Physical Therapy Daily Treatment Note  Date:  3/26/2021    Patient Name:  Gama Shankar    :  1940  MRN: [id number]  Restrictions/Precautions: PACEMAKER. NO E-STIM OR US; NO MECHANICAL TRACTION  Diagnosis:   Diagnosis: Chronic LBP w/ sciatica laterally, idiopathic scoliosis, chronic right shoulder/arm pain  Date of Injury/Surgery: Chronic  Treatment Diagnosis: Treatment Diagnosis: LBP w/forward bending and gait, right shoulder pain w weakness and impaired ROM/functional use, tight hip flexors, ITB, hamstrings    Insurance/Certification information: PT Insurance Information: Medicare   Referring Physician:  Referring Practitioner: Kemal Portillo  Next Doctor Visit:  Unknown  Plan of care signed (Y/N): Pending    Outcome Measure:   OSW (14% disability)  QD (29.5% disability)    Visit# / total visits:   1/  Pain level: 1-2/10    POC Date Range 3/26/21 - 21    Goals:          Long term goals  Time Frame for Long term goals : In 4 weeks, patient will  Long term goal 1: demonstrate compliance and independence w/HEP. Long term goal 2: score <= 10% disability on OSW LBPQ as indication of improved function w/daily activities. Long term goal 3: score <= 20% disability on Quick Dash as indication of improved function w/daily activities  Long term goal 4: demonstrate ability to raise right shoulder at least 110 deg w/10# weight in hand and place weight on shelf. Summary of Evaluation: Assessment: Pt is an 79 y.o. right hand dominant male w/DXChronic LBP w/ sciatica laterally, idiopathic scoliosis, chronic right shoulder/arm pain. Pt reports chronic right shoulder and back pain. 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   Also, reports back and right > left hip pain x many years as well as idiopathic scoliosis. Has been wearing an abdominal binder x at least 3 years w/good result. Wears the binder if he is going to be out and walking a long time. PLOF:  Ind. w/mobility and self-care, works out at [employer]. Due to SVT's, unable to walk on treadmill. Otherwise, is weight lifting and riding the bike w/o problem. Worse/Better:  Shoulder:  Raising RUE overhead, lifting./rest and not raising arm above shoulder height. Back:  Walking distances/sitting        Subjective:  See eval         Any changes in Ambulatory Summary Sheet? None        Objective:  See eval     Prior to today's treatment session, patient was screened for signs and symptoms related to COVID-19 including but not limited to verbally answering questions related to feeling ill, cough, or SOB, along with taking temperature via forehead thermometer. Patient presented with all negative signs and symptoms and had no fever >100 degrees Fahrenheit this date. Exercises: (No more than 4 columns)   Exercise/Equipment Date 3/26/21 #1 Date Date       See below for plan for treatment session    WARM UP                     TABLE                                       STANDING                                                     PROPRIOCEPTION                                    MODALITIES                      Other Therapeutic Activities/Education:    POC and treatment progression expected reviewed w/patient, as well as underlying pathology related to dysfunction and pain. Home Exercise Program:    TBD    Manual Treatments:    R shoulder posterior/inferior glides/gentle mobilization in supine    Modalities:    No    Communication with other providers:    POC faxed to ordering provider. Assessment:  (Response towards treatment session) (Pain Rating)  End Pain = 1-2/10, no change    Assessment: Pt is an 79 y.o. right hand dominant male w/DXChronic LBP w/ sciatica laterally, idiopathic scoliosis, chronic right shoulder/arm pain. Pt reports chronic right shoulder and back pain. 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 Also, reports back and right > left hip pain x many years as well as idiopathic scoliosis. Has been wearing an abdominal binder x at least 3 years w/good result. Wears the binder if he is going to be out and walking a long time. PLOF:  Ind. w/mobility and self-care, works out at [employer]. Due to SVT's, unable to walk on treadmill. Otherwise, is weight lifting and riding the bike w/o problem. Worse/Better:  Shoulder:  Raising RUE overhead, lifting./rest and not raising arm above shoulder height.   Back:  Walking distances/sitting      Plan for Next Session: Specific instructions for Next Treatment: Shoulder mobes to increase posterior and inferior translation, postural strengthening;  lumbar stabilization and BLE ROM (stretching and strengthening) has tight hamstrings and hip flexors (R>L), tight ITB bilat      Time In / Time Out:     0900/1005    If [facility] Please Indicate Time In/Out  CPT Code Time in Time out                                                              Timed Code/Total Treatment Minutes:  30'/65'  Manual 10' (1), ADL 20' (1)      Next Progress Note due:   4/23/21 (probable discharge)      Plan of Care Interventions:  [x] Therapeutic Exercise  [x] Modalities:  [x] Therapeutic Activity     [] Ultrasound  [] Estim  [] Gait Training      [] Cervical Traction [] Lumbar Traction  [x] Neuromuscular Re-education    [x] Cold/hotpack [] Iontophoresis   [x] Instruction in HEP      [x] Vasopneumatic   [] Dry Needling    [x] Manual Therapy               [] Aquatic Therapy              Electronically signed by:  Deborah Ventura, PT 3/26/2021, 5:44 PM

## 2021-03-26 NOTE — PROGRESS NOTES
Physical Therapy  Initial Assessment  Date: 3/26/2021  Patient Name: Perry Ibarra  MRN: [id number]  : 1940     Treatment Diagnosis: LBP w/forward bending and gait, right shoulder pain w weakness and impaired ROM/functional use, tight hip flexors, ITB, hamstrings    Restrictions  Position Activity Restriction  Other position/activity restrictions: No formal restrictions    Subjective   General  Chart Reviewed: Yes  Patient assessed for rehabilitation services?: Yes  Additional Pertinent Hx: PMH: acute asthmatic brochitis, blood clots, CAD, HLP, HTN, CABG x 4, A-fib, acute MI, hernia repair, SVT, pacemaker, TKA, esophagitis, diverticulitis, phlebitis, (2) cardiac valve leakage and being monitored  Family / Caregiver Present: No  Referring Practitioner: Mendy Garcia  Diagnosis: Chronic LBP w/ sciatica laterally, idiopathic scoliosis, chronic right shoulder/arm pain  Follows Commands: Within Functional Limits  PT Visit Information  Onset Date: (R arm and shoulder: chronic x 3 years - possible frozen shoulder, back chronic x many years)  PT Insurance Information: Medicare  Total # of Visits Approved: (BOMN)  Subjective  Subjective: Pt reports chronic right shoulder and back pain. 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 Also, reports back and right > left hip pain x many years as well as idiopathic scoliosis. Has been wearing an abdominal binder x at least 3 years w/good result. Wears the binder if he is going to be out and walking a long time. PLOF:  Ind. w/mobility and self-care, works out at [employer]. Due to SVT's, unable to walk on treadmill. Otherwise, is weight lifting and riding the bike w/o problem. Worse/Better:  Shoulder:  Raising RUE overhead, lifting./rest and not raising arm above shoulder height.   Back:  Walking distances/sitting  Pain Screening  Patient Currently in Pain: Yes  Pain Assessment  Pain Assessment: 0-10  Pain Level: 2  Patient's Stated Pain Goal: No pain  Pain Type: Chronic pain  Pain Location: Shoulder  Pain Orientation: Right  Pain Frequency: Intermittent  Functional Pain Assessment: Activities are not prevented  Multiple Pain Sites: Yes  Pain 2  Pain Rating 2: 3(R low back/posterior upper hip)  Vital Signs  Patient Currently in Pain: Yes    Vision/Hearing  Vision  Vision: Impaired(corrective glasses)  Hearing  Hearing: Exceptions to [facility]  Hearing Exceptions: Bilateral hearing aid    Orientation  Orientation  Overall Orientation Status: Within Normal Limits    Social/Functional History  Social/Functional History  Lives With: Spouse  Type of Home: House  Home Layout: One level  Home Access: Stairs to enter without rails  Entrance Stairs - Number of Steps: 1 richard  Bathroom Shower/Tub: Tub/Shower unit  Bathroom Toilet: Handicap height  Bathroom Equipment: Grab bars in shower  Home Equipment: Cane  ADL Assistance: Independent  Homemaking Assistance: Independent  Homemaking Responsibilities: Yes  Ambulation Assistance: Independent  Transfer Assistance: Independent  Active : Yes  Mode of Transportation: Car  Occupation: Retired  Type of occupation: [employer]  Leisure & Hobbies: Enjoys working out and dancing    Objective     Observation/Palpation  Posture: Fair  Palpation: TTP R AC joint, TTP R > L SI joints, R gluteus medius and paraspinals > L  Observation: Left lateral shift in trunk (scoliosis),  right shoulder riding high in glenohumeral joint, right shoulder elevated. Protracted scapulae/forward shoulders R>L.     PROM RLE (degrees)  RLE PROM: WFL  RLE General PROM: Restricted ITB, hip flexors, hamstrings; SLR supine = 60 deg no pain  AROM RLE (degrees)  RLE AROM: WFL  PROM LLE (degrees)  LLE PROM: WFL  LLE General PROM: Restricted ITB, hip flexors, hamstrings, SLR supine = 70 deg no pain  AROM LLE (degrees)  LLE AROM : WFL  PROM RUE (degrees)  RUE General PROM: shoulder flexion 115 deg, abduction 75 deg w/pain, ER 47 deg (shoulder abd 75 deg), IR 43 deg (shoulder abd 75 deg)  AROM RUE (degrees)  RUE General AROM: shoulder flexion in plane of scaption = 80 deg  PROM LUE (degrees)  LUE PROM: WFL  AROM LUE (degrees)  LUE AROM : WFL  LUE General AROM: Shoulder flexion in plane of scaption = 130 deg  Spine  Cervical: C-spine canted right at rest in standing x 15 deg  Lumbar: FF 82 deg w/discomfort across low back, EXT 22 deg w/o pain    Strength RLE  Strength RLE: WFL  Comment: 4/5 hip, 5/5 knee and ankle  Strength LLE  Strength LLE: WFL  Comment: 4/5 hip, 5/5 knee and ankle  Strength RUE  Comment: shoulder flex/abd 2+ to 3-/5, shoulder IR 3+/5, shoulder ER 2+ to 3-/5      Elbow flex/ext 4/5   (patient is R hand dominant)  Strength LUE  Comment: 4/5 shoulder, 5/5 elbow     Additional Measures  Flexibility: Tight hamstrings RLE > LLE, tight ITB and hip flexors bilat  Special Tests: (+) Pramod's and modified Cameron bilat LE     (-) slump  Sensation  Overall Sensation Status: WFL  Bed mobility  Supine to Sit: Independent  Sit to Supine: Independent  Transfers  Sit to Stand: Modified independent  Stand to sit: Modified independent       Ambulation  Ambulation?: Yes  Ambulation 1  Surface: level tile;carpet  Device: No Device  Assistance: Independent  Quality of Gait: RLE slightly ER  Distance: 100 ft x 2        Assessment   Conditions Requiring Skilled Therapeutic Intervention  Body structures, Functions, Activity limitations: Decreased functional mobility ; Decreased ADL status; Decreased ROM; Decreased strength; Increased pain;Decreased posture  Assessment: Pt is an 79 y.o. right hand dominant male w/DXChronic LBP w/ sciatica laterally, idiopathic scoliosis, chronic right shoulder/arm pain. Pt reports chronic right shoulder and back pain. 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   Also, reports back and right > left hip pain x many years as well as idiopathic scoliosis. Has been wearing an abdominal binder x at least 3 years w/good result. Wears the binder if he is going to be out and walking a long time. PLOF:  Ind. w/mobility and self-care, works out at [employer]. Due to SVT's, unable to walk on treadmill. Otherwise, is weight lifting and riding the bike w/o problem. Worse/Better:  Shoulder:  Raising RUE overhead, lifting./rest and not raising arm above shoulder height. Back:  Walking distances/sitting  Treatment Diagnosis: LBP w/forward bending and gait, right shoulder pain w weakness and impaired ROM/functional use, tight hip flexors, ITB, hamstrings  Prognosis: Fair;Good  Decision Making: Medium Complexity  History: PMH: acute asthmatic brochitis, blood clots, CAD, HLP, HTN, CABG x 4, A-fib, acute MI, hernia repair, SVT, pacemaker, TKA, esophagitis, diverticulitis, phlebitis, (2) cardiac valve leakage and being monitored  Exam: MMT, ROM, palpation, gait  Clinical Presentation: Medium complexity  Barriers to Learning: None noted. Wears HA's w/good result. REQUIRES PT FOLLOW UP: Yes         Plan   Plan  Times per week: 2  Plan weeks: 4  Specific instructions for Next Treatment: Shoulder mobes to increase posterior and inferior translation, postural strengthening;  lumbar stabilization and BLE ROM (stretching and strengthening) has tight hamstrings and hip flexors (R>L), tight ITB bilat  Current Treatment Recommendations: Strengthening, ROM, Balance Training, Functional Mobility Training, Neuromuscular Re-education, Manual Therapy - Soft Tissue Mobilization, Manual Therapy - Joint Manipulation, Patient/Caregiver Education & Training, Home Exercise Program, Modalities  Plan Comment: Has a pacemaker: No E-stim or US. Heat/cold okay         Goals  Long term goals  Time Frame for Long term goals : In 4 weeks, patient will  Long term goal 1: demonstrate compliance and independence w/HEP.   Long term goal 2: score <= 10% disability on OSW LBPQ

## 2021-03-29 ENCOUNTER — OFFICE VISIT (OUTPATIENT)
Dept: PULMONOLOGY | Age: 81
End: 2021-03-29
Payer: MEDICARE

## 2021-03-29 VITALS
DIASTOLIC BLOOD PRESSURE: 76 MMHG | WEIGHT: 147 LBS | HEART RATE: 98 BPM | OXYGEN SATURATION: 96 % | SYSTOLIC BLOOD PRESSURE: 120 MMHG | HEIGHT: 72 IN | BODY MASS INDEX: 19.91 KG/M2

## 2021-03-29 DIAGNOSIS — J45.20 MILD INTERMITTENT ASTHMA WITHOUT COMPLICATION: ICD-10-CM

## 2021-03-29 PROBLEM — J45.30 MILD PERSISTENT ASTHMA WITHOUT COMPLICATION: Status: RESOLVED | Noted: 2017-01-11 | Resolved: 2021-03-29

## 2021-03-29 PROCEDURE — 1123F ACP DISCUSS/DSCN MKR DOCD: CPT | Performed by: INTERNAL MEDICINE

## 2021-03-29 PROCEDURE — 1036F TOBACCO NON-USER: CPT | Performed by: INTERNAL MEDICINE

## 2021-03-29 PROCEDURE — G8427 DOCREV CUR MEDS BY ELIG CLIN: HCPCS | Performed by: INTERNAL MEDICINE

## 2021-03-29 PROCEDURE — 99213 OFFICE O/P EST LOW 20 MIN: CPT | Performed by: INTERNAL MEDICINE

## 2021-03-29 PROCEDURE — G8484 FLU IMMUNIZE NO ADMIN: HCPCS | Performed by: INTERNAL MEDICINE

## 2021-03-29 PROCEDURE — 4040F PNEUMOC VAC/ADMIN/RCVD: CPT | Performed by: INTERNAL MEDICINE

## 2021-03-29 PROCEDURE — G8420 CALC BMI NORM PARAMETERS: HCPCS | Performed by: INTERNAL MEDICINE

## 2021-03-29 NOTE — PROGRESS NOTES
SUBJECTIVE:  Chief Complaint: Mild intermittent asthma  Merlyn Cabot states that he has had no issues with his asthma over the past 6 months. He does use his ProAir rescue inhaler as needed and typically does take 2 puffs each morning. He does not require it very often. He does exercise on a daily basis. He is not on long-acting bronchodilators. He has not had any COVID-19 infection or exposure that he is aware of and has received both Moderna COVID-19 vaccinations and has tolerated that quite well      ROS:  Constitution:  HEENT: Negative for ear, throat pain  Cardiovascular: Negative for chest pain, syncope, edema  Pulmonary: See HPI  Musculoskeletal: Negative for DVT, myalgias, arthralgias    OBJECTIVE:  /76   Pulse 98   Ht 6' (1.829 m)   Wt 147 lb (66.7 kg)   SpO2 96%   BMI 19.94 kg/m²      Physical Exam:  Constitutional:  He appears well developed and well-nourished. Neck:  Supple, No palpable lymphadenopathy, No JVD  Cardiovascular:  S1, S2 Normal, Regular rhythm, no murmurs or gallops, No pericardial  rubs. Pulmonary: Breath sounds are clear throughout all areas without wheezing or rhonchi  Abdomen: Not examined  Extremities: no edema, No DVT  Neurologic: Oriented x3, No focal deficits    Radiology: Chest x-ray on 9/28/2020 showed no acute findings  PFT: Office spirometry on 3/2/2020 demonstrated no significant airways obstruction and no significant response to bronchodilators      Echocardiogram: No echo available    ASSESSMENT:    1. Mild intermittent asthma without complication          PLAN:   He is up-to-date with his inhaled bronchodilators. I will make no change in his current therapy. I would like to repeat his pulmonary function test in several months. I will continue to follow him    We have discussed the need to maintain yearly flu immunization, pneumococcal vaccination.  We have discussed Coronavirus precaution including handwashing practice, wiping items touched in public such as

## 2021-04-02 ENCOUNTER — HOSPITAL ENCOUNTER (OUTPATIENT)
Dept: PHYSICAL THERAPY | Age: 81
Setting detail: THERAPIES SERIES
Discharge: HOME | End: 2021-04-02
Payer: MEDICARE

## 2021-04-02 PROCEDURE — 9990000042 HC WELLNESS CENTER INDV PER MONTH

## 2021-04-03 ENCOUNTER — HOSPITAL ENCOUNTER (OUTPATIENT)
Dept: PHYSICAL THERAPY | Age: 81
Setting detail: THERAPIES SERIES
Discharge: HOME | End: 2021-04-03
Payer: MEDICARE

## 2021-04-03 PROCEDURE — 97140 MANUAL THERAPY 1/> REGIONS: CPT

## 2021-04-03 PROCEDURE — 97110 THERAPEUTIC EXERCISES: CPT

## 2021-04-03 NOTE — FLOWSHEET NOTE
years as well as idiopathic scoliosis. Has been wearing an abdominal binder x at least 3 years w/good result. Wears the binder if he is going to be out and walking a long time. PLOF:  Ind. w/mobility and self-care, works out at [facility] [employer]. Due to SVT's, unable to walk on treadmill. Otherwise, is weight lifting and riding the bike w/o problem. Worse/Better:  Shoulder:  Raising RUE overhead, lifting./rest and not raising arm above shoulder height. Back:  Walking distances/sitting        Subjective:  Patient reports of 5-6/10 pain in the R shld and 4-5/10 in the R hip upon arrival.        Any changes in Ambulatory Summary Sheet? None        Objective:   ABD 90*    Prior to today's treatment session, patient was screened for signs and symptoms related to COVID-19 including but not limited to verbally answering questions related to feeling ill, cough, or SOB, along with taking temperature via forehead thermometer. Patient presented with all negative signs and symptoms and had no fever >100 degrees Fahrenheit this date. Exercises: (No more than 4 columns)   Exercise/Equipment Date 3/26/21 #1 Date 4/3/2021 Date       See below for plan for treatment session    WARM UP      Nustep     S10/A11 Lv4 7'    Pulleys Flex  10x5\"    TABLE      R shld PROM  Flex/ABD/ER    HS stretch manual  2x30\" B    Hip flexor stretch  2x30\" B    Scap ret  10x3\"                                     STANDING                                                     PROPRIOCEPTION                                    MODALITIES                      Other Therapeutic Activities/Education:    POC and treatment progression expected reviewed w/patient, as well as underlying pathology related to dysfunction and pain.     Home Exercise Program:    TBD    Manual Treatments:    R shoulder posterior/inferior glides/gentle mobilization in supine as well as PROM    Modalities:    No    Communication with other providers:    POC faxed to ordering

## 2021-04-06 ENCOUNTER — HOSPITAL ENCOUNTER (OUTPATIENT)
Dept: PHYSICAL THERAPY | Age: 81
Setting detail: THERAPIES SERIES
Discharge: HOME | End: 2021-04-06
Payer: MEDICARE

## 2021-04-06 PROCEDURE — 97110 THERAPEUTIC EXERCISES: CPT

## 2021-04-06 PROCEDURE — 97140 MANUAL THERAPY 1/> REGIONS: CPT

## 2021-04-06 NOTE — FLOWSHEET NOTE
Outpatient Physical Therapy  [place]           [x] Phone: [phone]   Fax: [phone]  Andre Kaplan           [] Phone: [phone]   Fax: [phone]        Physical Therapy Daily Treatment Note  Date:  2021    Patient Name:  Cindi Romero    :  1940  MRN: [id number]  Restrictions/Precautions: PACEMAKER. NO E-STIM OR US; NO MECHANICAL TRACTION  Diagnosis:   Diagnosis: Chronic LBP w/ sciatica laterally, idiopathic scoliosis, chronic right shoulder/arm pain  Date of Injury/Surgery: Chronic  Treatment Diagnosis: Treatment Diagnosis: LBP w/forward bending and gait, right shoulder pain w weakness and impaired ROM/functional use, tight hip flexors, ITB, hamstrings    Insurance/Certification information: PT Insurance Information: Medicare   Referring Physician:  Referring Practitioner: Stephenie Hernandez  Next Doctor Visit:  Unknown  Plan of care signed (Y/N): Pending    Outcome Measure:   OSW (14% disability)  QD (29.5% disability)    Visit# / total visits:  3/  Pain level: 5/10 R shld   3/10 LBP  POC Date Range 3/26/21 - 21    Goals:          Long term goals  Time Frame for Long term goals : In 4 weeks, patient will  Long term goal 1: demonstrate compliance and independence w/HEP. Long term goal 2: score <= 10% disability on OSW LBPQ as indication of improved function w/daily activities. Long term goal 3: score <= 20% disability on Quick Dash as indication of improved function w/daily activities  Long term goal 4: demonstrate ability to raise right shoulder at least 110 deg w/10# weight in hand and place weight on shelf. Summary of Evaluation: Assessment: Pt is an 79 y.o. right hand dominant male w/DXChronic LBP w/ sciatica laterally, idiopathic scoliosis, chronic right shoulder/arm pain. Pt reports chronic right shoulder and back pain. 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   Also, reports back and right > left hip pain x many years as well as idiopathic scoliosis. Has been wearing an abdominal binder x at least 3 years w/good result. Wears the binder if he is going to be out and walking a long time. PLOF:  Ind. w/mobility and self-care, works out at [facility] [employer]. Due to SVT's, unable to walk on treadmill. Otherwise, is weight lifting and riding the bike w/o problem. Worse/Better:  Shoulder:  Raising RUE overhead, lifting./rest and not raising arm above shoulder height. Back:  Walking distances/sitting        Subjective:  Patient reports of 5/10 pain in the R shld and 3/10 in the LBP upon arrival.     Any changes in Ambulatory Summary Sheet? None        Objective:       ABD 90* pre tx  shoulder flexion in plane of scaption = 95 deg    Leg length discrepancy noted arrival. Improvement with pelvic MET  Decreased tolerance to inferior mob with superior shoulder pain with pressure. Tight R> L gluteal muscles   Weak core. IR Bilat shoulder, fwd flexed posture and L Lateral truncal pronounces curvature. Prior to today's treatment session, patient was screened for signs and symptoms related to COVID-19 including but not limited to verbally answering questions related to feeling ill, cough, or SOB, along with taking temperature via forehead thermometer. Patient presented with all negative signs and symptoms and had no fever >100 degrees Fahrenheit this date. Exercises: (No more than 4 columns)   Exercise/Equipment Date 3/26/21 #1 Date 4/3/2021 Date  4/6/21       See below for plan for treatment session    WARM UP      Nustep     S10/A11 Lv4 7' S10/A11 Lv4 7'   Supine cane flex/ER   5 ct x 10 ea.    Pulleys Flex  10x5\"    TABLE      R shld PROM  Flex/ABD/ER Flex/ABD/ER   AAROM flex hand clasp   10x5\"   TA   5 ct x 10   Marches + TA   X 10    HS stretch manual  2x30\" B 2x30\" B   Hip flexor stretch  2x30\" B 2x30\" B   Scap ret  10x3\"                                     STANDING PROPRIOCEPTION                                    MODALITIES                      Other Therapeutic Activities/Education:    POC and treatment progression expected reviewed w/patient, as well as underlying pathology related to dysfunction and pain. Home Exercise Program:    4/6/21 AAROM flex hand clasp, cane ER    Manual Treatments:    R shoulder posterior/inferior glides/gentle mobilization in supine as well as PROM. Pelvic MET     Modalities:    No    Communication with other providers:    POC faxed to ordering provider. Assessment:  (Response towards treatment session) (Pain Rating) Pt demonstrated GOOD tolerance to tx today with added exercise, decreased pain and increase R shoulder ROM to >90 dg. Resolves LBP. Pt reports feeling taller at departure. Pt would continue to benefit from skilled therapy interventions to address remaining impairments, improve mobility and strength and progress toward goal completion while reducing risk for re-injury or further decline. End Pain = 0/10 LB, 3/10 shoulder    Assessment: Pt is an 79 y.o. right hand dominant male w/DXChronic LBP w/ sciatica laterally, idiopathic scoliosis, chronic right shoulder/arm pain. Pt reports chronic right shoulder and back pain. 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 Also, reports back and right > left hip pain x many years as well as idiopathic scoliosis. Has been wearing an abdominal binder x at least 3 years w/good result. Wears the binder if he is going to be out and walking a long time. PLOF:  Ind. w/mobility and self-care, works out at [employer]. Due to SVT's, unable to walk on treadmill. Otherwise, is weight lifting and riding the bike w/o problem. Worse/Better:  Shoulder:  Raising RUE overhead, lifting./rest and not raising arm above shoulder height.   Back:  Walking distances/sitting      Plan for Next Session: Specific instructions for Next Treatment:

## 2021-04-09 ENCOUNTER — HOSPITAL ENCOUNTER (OUTPATIENT)
Dept: PHYSICAL THERAPY | Age: 81
Setting detail: THERAPIES SERIES
Discharge: HOME | End: 2021-04-09
Payer: MEDICARE

## 2021-04-09 PROCEDURE — 97110 THERAPEUTIC EXERCISES: CPT

## 2021-04-09 PROCEDURE — 97140 MANUAL THERAPY 1/> REGIONS: CPT

## 2021-04-09 NOTE — FLOWSHEET NOTE
Outpatient Physical Therapy  [place]           [x] Phone: [phone]   Fax: [phone]  [place]           [] Phone: [phone]   Fax: [phone]        Physical Therapy Daily Treatment Note  Date:  2021    Patient Name:  Pollo Dixon    :  1940  MRN: [id number]  Restrictions/Precautions: PACEMAKER. NO E-STIM OR US; NO MECHANICAL TRACTION  Diagnosis:   Diagnosis: Chronic LBP w/ sciatica laterally, idiopathic scoliosis, chronic right shoulder/arm pain  Date of Injury/Surgery: Chronic  Treatment Diagnosis: Treatment Diagnosis: LBP w/forward bending and gait, right shoulder pain w weakness and impaired ROM/functional use, tight hip flexors, ITB, hamstrings    Insurance/Certification information: PT Insurance Information: Medicare   Referring Physician:  Referring Practitioner: Demetrius Mata Doctor Visit:  Unknown  Plan of care signed (Y/N): Pending    Outcome Measure:   OSW (14% disability)  QD (29.5% disability)    Visit# / total visits:    Pain level: 1/10 R shld   10 LBP  POC Date Range 3/26/21 - 21    Goals:          Long term goals  Time Frame for Long term goals : In 4 weeks, patient will  Long term goal 1: demonstrate compliance and independence w/HEP. Long term goal 2: score <= 10% disability on OSW LBPQ as indication of improved function w/daily activities. Long term goal 3: score <= 20% disability on Quick Dash as indication of improved function w/daily activities  Long term goal 4: demonstrate ability to raise right shoulder at least 110 deg w/10# weight in hand and place weight on shelf. Summary of Evaluation: Assessment: Pt is an 79 y.o. right hand dominant male w/DXChronic LBP w/ sciatica laterally, idiopathic scoliosis, chronic right shoulder/arm pain. Pt reports chronic right shoulder and back pain. 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   Also, reports back and right > left hip pain x many years as well as idiopathic scoliosis. Has been wearing an abdominal binder x at least 3 years w/good result. Wears the binder if he is going to be out and walking a long time. PLOF:  Ind. w/mobility and self-care, works out at [facility] [employer]. Due to SVT's, unable to walk on treadmill. Otherwise, is weight lifting and riding the bike w/o problem. Worse/Better:  Shoulder:  Raising RUE overhead, lifting./rest and not raising arm above shoulder height. Back:  Walking distances/sitting        Subjective:  Patient reports his shoulder is feeling better. He can reach a little higher but has a clicking. He didn't think his shoulder would work again. Any changes in Ambulatory Summary Sheet? None        Objective:       ABD 90* pre tx  shoulder flexion in plane of scaption = 95 deg    Decreased tolerance to inferior mob with superior shoulder pain with pressure. Tight R> L gluteal muscles   Weak core. IR Bilat shoulder, fwd flexed posture and L Lateral truncal pronounces curvature. Instability of the pelvis with HS stretches. Prior to today's treatment session, patient was screened for signs and symptoms related to COVID-19 including but not limited to verbally answering questions related to feeling ill, cough, or SOB, along with taking temperature via forehead thermometer. Patient presented with all negative signs and symptoms and had no fever >100 degrees Fahrenheit this date. Exercises: (No more than 4 columns)   Exercise/Equipment Date 3/26/21 #1 Date 4/3/2021 Date  4/6/21 4/9/21 #4       See below for plan for treatment session     WARM UP       Nustep     S10/A11 Lv4 7' S10/A11 Lv4 7' S10/A11 Lv5 5'    Towel for lumbar stability   Supine cane flex/ER   5 ct x 10 ea. 5 ct x 10 ea.    Pulleys Flex  10x5\"  At end  10x 5\"   TABLE       R shld PROM  Flex/ABD/ER Flex/ABD/ER Flex/ABD/ER   AAROM flex hand clasp   10x5\" 10 x 5\"   punches    10 x R   AAROM PNF flex    10 x    TA   5 ct x 10 5 ct x 10 SVT's, unable to walk on treadmill. Otherwise, is weight lifting and riding the bike w/o problem. Worse/Better:  Shoulder:  Raising RUE overhead, lifting./rest and not raising arm above shoulder height.   Back:  Walking distances/sitting      Plan for Next Session: Specific instructions for Next Treatment: Shoulder mobes to increase posterior and inferior translation, postural strengthening;  lumbar stabilization and BLE ROM (stretching and strengthening) has tight hamstrings and hip flexors (R>L), tight ITB bilat      Time In / Time Out:   0345/1428      Timed Code/Total Treatment Minutes:   43'/43' 30 TE 13 MT       Next Progress Note due:   4/23/21 (probable discharge)      Plan of Care Interventions:  [x] Therapeutic Exercise  [x] Modalities:  [x] Therapeutic Activity     [] Ultrasound  [] Estim  [] Gait Training      [] Cervical Traction [] Lumbar Traction  [x] Neuromuscular Re-education    [x] Cold/hotpack [] Iontophoresis   [x] Instruction in HEP      [x] Vasopneumatic   [] Dry Needling    [x] Manual Therapy               [] Aquatic Therapy              Electronically signed by:  Dannie Perea PTA, CLT 4/9/2021, 1:43 PM

## 2021-04-12 ENCOUNTER — HOSPITAL ENCOUNTER (OUTPATIENT)
Dept: PHYSICAL THERAPY | Age: 81
Setting detail: THERAPIES SERIES
Discharge: HOME | End: 2021-04-12
Payer: MEDICARE

## 2021-04-12 PROCEDURE — 97140 MANUAL THERAPY 1/> REGIONS: CPT

## 2021-04-12 PROCEDURE — 97112 NEUROMUSCULAR REEDUCATION: CPT

## 2021-04-12 PROCEDURE — 97110 THERAPEUTIC EXERCISES: CPT

## 2021-04-12 NOTE — FLOWSHEET NOTE
flex hand clasp   10x5\" 10 x 5\"    punches    10 x R    AAROM PNF flex    10 x     TA   5 ct x 10 5 ct x 10    Marches + TA   X 10  X 10    HS stretch manual  2x30\" B 2x30\" B 2x30\" B    Hip flexor stretch  2x30\" B 2x30\" B 2x30\" B    Scap ret  10x3\"   2 x 10  Sitting  YTB  Vc/demo shoulder depression   UT and levator stretch     2 x 15 sec ea R/L  Sitting  Self-stretch. Encouraged shoulder depression hand press toward floor to increase stretch                                      STANDING                Mid/low row    X 10 YTB  Does mid row on the [employer] at gym                                                 [facility] 12                            Other Therapeutic Activities/Education:    POC and treatment progression expected reviewed w/patient, as well as underlying pathology related to dysfunction and pain. Home Exercise Program:    4/6/21 AAROM flex hand clasp, cane ER  4/9/21 low row, mid row YTB    Manual Treatments:    R shoulder posterior/inferior glides/gentle mobilization in supine as well as PROM. Modalities:    No    Communication with other providers:    POC faxed to ordering provider. Assessment:  (Response towards treatment session) (Pain Rating)    Pelvic obliquity. upslip L ASIS and posterior innominate. Able to improve (but not totally correct) w/piriformis stretch, HSS, QL stretch  Pt would continue to benefit from skilled therapy interventions to address remaining impairments, improve mobility and strength and progress toward goal completion while reducing risk for re-injury or further decline. End Pain = 0/10 LB, 0/10 shoulder    Assessment: Pt is an 79 y.o. right hand dominant male w/DXChronic LBP w/ sciatica laterally, idiopathic scoliosis, chronic right shoulder/arm pain. Pt reports chronic right shoulder and back pain. 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 Also, reports back and right > left hip pain x many years as well as idiopathic scoliosis. Has been wearing an abdominal binder x at least 3 years w/good result. Wears the binder if he is going to be out and walking a long time. PLOF:  Ind. w/mobility and self-care, works out at [employer]. Due to SVT's, unable to walk on treadmill. Otherwise, is weight lifting and riding the bike w/o problem. Worse/Better:  Shoulder:  Raising RUE overhead, lifting./rest and not raising arm above shoulder height.   Back:  Walking distances/sitting      Plan for Next Session: Specific instructions for Next Treatment: Shoulder mobes to increase posterior and inferior translation, postural strengthening;  lumbar stabilization and BLE ROM (stretching and strengthening) has tight hamstrings and hip flexors (R>L), tight ITB bilat      Time In / Time Out:  0930/1020      Timed Code/Total Treatment Minutes:   50'/50'  TE `5' (1), Manual 20' (1), NRE 13' (1)      Next Progress Note due:   4/23/21 (probable discharge)      Plan of Care Interventions:  [x] Therapeutic Exercise  [x] Modalities:  [x] Therapeutic Activity     [] Ultrasound  [] Estim  [] Gait Training      [] Cervical Traction [] Lumbar Traction  [x] Neuromuscular Re-education    [x] Cold/hotpack [] Iontophoresis   [x] Instruction in HEP      [x] Vasopneumatic   [] Dry Needling    [x] Manual Therapy               [] Aquatic Therapy              Electronically signed by:  Darya Fajardo, PT 4/12/2021, 9:20 AM

## 2021-04-13 ENCOUNTER — HOSPITAL ENCOUNTER (OUTPATIENT)
Dept: PHYSICAL THERAPY | Age: 81
Setting detail: THERAPIES SERIES
Discharge: HOME | End: 2021-04-13
Payer: MEDICARE

## 2021-04-13 PROCEDURE — 97112 NEUROMUSCULAR REEDUCATION: CPT

## 2021-04-13 PROCEDURE — 97110 THERAPEUTIC EXERCISES: CPT

## 2021-04-13 PROCEDURE — 97530 THERAPEUTIC ACTIVITIES: CPT

## 2021-04-13 NOTE — FLOWSHEET NOTE
Outpatient Physical Therapy  [place]           [x] Phone: [phone]   Fax: [phone]  January Manzanares           [] Phone: [phone]   Fax: [phone]        Physical Therapy Daily Treatment Note  Date:  2021    Patient Name:  Luigi Howell    :  1940  MRN: [id number]  Restrictions/Precautions: PACEMAKER. NO E-STIM OR US; NO MECHANICAL TRACTION  Diagnosis:   Diagnosis: Chronic LBP w/ sciatica laterally, idiopathic scoliosis, chronic right shoulder/arm pain  Date of Injury/Surgery: Chronic  Treatment Diagnosis: Treatment Diagnosis: LBP w/forward bending and gait, right shoulder pain w weakness and impaired ROM/functional use, tight hip flexors, ITB, hamstrings    Insurance/Certification information: PT Insurance Information: Medicare   Referring Physician:  Referring Practitioner: Marylen Calamity  Next Doctor Visit:  Unknown  Plan of care signed (Y/N): Pending    Outcome Measure:   OSW (14% disability)  QD (29.5% disability)    Visit# / total visits:    Pain level: 3/10 R shld   0/10 LBP  POC Date Range 3/26/21 - 21    Goals:          Long term goals  Time Frame for Long term goals : In 4 weeks, patient will  Long term goal 1: demonstrate compliance and independence w/HEP. Long term goal 2: score <= 10% disability on OSW LBPQ as indication of improved function w/daily activities. Long term goal 3: score <= 20% disability on Quick Dash as indication of improved function w/daily activities  Long term goal 4: demonstrate ability to raise right shoulder at least 110 deg w/10# weight in hand and place weight on shelf. Summary of Evaluation: Assessment: Pt is an 79 y.o. right hand dominant male w/DXChronic LBP w/ sciatica laterally, idiopathic scoliosis, chronic right shoulder/arm pain. Pt reports chronic right shoulder and back pain. 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   Also, reports back and right > left hip pain x many years as well as idiopathic scoliosis. Has been wearing an abdominal binder x at least 3 years w/good result. Wears the binder if he is going to be out and walking a long time. PLOF:  Ind. w/mobility and self-care, works out at [facility] [employer]. Due to SVT's, unable to walk on treadmill. Otherwise, is weight lifting and riding the bike w/o problem. Worse/Better:  Shoulder:  Raising RUE overhead, lifting./rest and not raising arm above shoulder height. Back:  Walking distances/sitting        Subjective:  Pt stated he taped a towel roll to his back last night when watching tv and it was comfortable. R shoulder has some pain lateral shoulder 3/10. Wants to work on his shoulder now that his back down hurt anymore. Any changes in Ambulatory Summary Sheet? None        Objective:     Pelvic obliquity  ABD 90* pre tx  shoulder flexion in plane of scaption = 95 deg      Prior to today's treatment session, patient was screened for signs and symptoms related to COVID-19 including but not limited to verbally answering questions related to feeling ill, cough, or SOB, along with taking temperature via forehead thermometer. Patient presented with all negative signs and symptoms and had no fever >100 degrees Fahrenheit this date. Exercises: (No more than 4 columns)   Exercise/Equipment Date 3/26/21 #1 Date 4/3/2021 Date  4/6/21 4/9/21 #4 4/12/21 4/13/21#6       See below for plan for treatment session       WARM UP         Nustep     S10/A11 Lv4 7' S10/A11 Lv4 7' S10/A11 Lv5 5'    Towel for lumbar stability S10/A11 Lv5 5'    Towel for lumbar stability S10/A11 Lv5 5'    Towel for lumbar stability   Supine cane flex/ER   5 ct x 10 ea. 5 ct x 10 ea. 5 ct x 10 ea.    Pulleys Flex  10x5\"  At end  10x 5\"     TABLE         R shld PROM  Flex/ABD/ER Flex/ABD/ER Flex/ABD/ER     AAROM flex hand clasp   10x5\" 10 x 5\"  10 x 5\"   punches    10 x R  10 x 1#   AAROM PNF flex    10 x      TA   5 ct x 10 5 ct x 10     Marches + scoliosis, chronic right shoulder/arm pain. Pt reports chronic right shoulder and back pain. 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 Also, reports back and right > left hip pain x many years as well as idiopathic scoliosis. Has been wearing an abdominal binder x at least 3 years w/good result. Wears the binder if he is going to be out and walking a long time. PLOF:  Ind. w/mobility and self-care, works out at [employer]. Due to SVT's, unable to walk on treadmill. Otherwise, is weight lifting and riding the bike w/o problem. Worse/Better:  Shoulder:  Raising RUE overhead, lifting./rest and not raising arm above shoulder height.   Back:  Walking distances/sitting      Plan for Next Session: Specific instructions for Next Treatment: Shoulder mobes to increase posterior and inferior translation, postural strengthening;  lumbar stabilization and BLE ROM (stretching and strengthening) has tight hamstrings and hip flexors (R>L), tight ITB bilat      Time In / Time Out:  0930/1015      Timed Code/Total Treatment Minutes:   45'/45'  15 TE 15 TA 15 NR      Next Progress Note due:   4/23/21 (probable discharge)      Plan of Care Interventions:  [x] Therapeutic Exercise  [x] Modalities:  [x] Therapeutic Activity     [] Ultrasound  [] Estim  [] Gait Training      [] Cervical Traction [] Lumbar Traction  [x] Neuromuscular Re-education    [x] Cold/hotpack [] Iontophoresis   [x] Instruction in HEP      [x] Vasopneumatic   [] Dry Needling    [x] Manual Therapy               [] Aquatic Therapy              Electronically signed by:  Jerrica Mercer PTA, CLT 4/13/2021, 9:28 AM

## 2021-04-20 ENCOUNTER — HOSPITAL ENCOUNTER (OUTPATIENT)
Dept: PHYSICAL THERAPY | Age: 81
Setting detail: THERAPIES SERIES
Discharge: HOME | End: 2021-04-20
Payer: MEDICARE

## 2021-04-20 PROCEDURE — 97110 THERAPEUTIC EXERCISES: CPT

## 2021-04-20 PROCEDURE — 97112 NEUROMUSCULAR REEDUCATION: CPT

## 2021-04-20 NOTE — FLOWSHEET NOTE
Outpatient Physical Therapy  [place]           [x] Phone: [phone]   Fax: [phone]  [facility] tutu           [] Phone: [phone]   Fax: [phone]        Physical Therapy Daily Treatment Note  Date:  2021    Patient Name:  Eligio Holliday    :  1940  MRN: [id number]  Restrictions/Precautions: PACEMAKER. NO E-STIM OR US; NO MECHANICAL TRACTION  Diagnosis:   Diagnosis: Chronic LBP w/ sciatica laterally, idiopathic scoliosis, chronic right shoulder/arm pain  Date of Injury/Surgery: Chronic  Treatment Diagnosis: Treatment Diagnosis: LBP w/forward bending and gait, right shoulder pain w weakness and impaired ROM/functional use, tight hip flexors, ITB, hamstrings    Insurance/Certification information: PT Insurance Information: Medicare   Referring Physician:  Referring Practitioner: Tea Guan  Next Doctor Visit:  Unknown  Plan of care signed (Y/N): Pending    Outcome Measure:   OSW (14% disability)  QD (29.5% disability)    Visit# / total visits:  8  Pain level: 0/10 R shld;  0/10 LBP    POC Date Range 3/26/21 - 21    Goals:          Long term goals  Time Frame for Long term goals : In 4 weeks, patient will  Long term goal 1: demonstrate compliance and independence w/HEP. Long term goal 2: score <= 10% disability on OSW LBPQ as indication of improved function w/daily activities. Long term goal 3: score <= 20% disability on Quick Dash as indication of improved function w/daily activities  Long term goal 4: demonstrate ability to raise right shoulder at least 110 deg w/10# weight in hand and place weight on shelf. Summary of Evaluation: Assessment: Pt is an 79 y.o. right hand dominant male w/DXChronic LBP w/ sciatica laterally, idiopathic scoliosis, chronic right shoulder/arm pain. Pt reports chronic right shoulder and back pain. 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   Also, reports back and right > left hip pain x many years as well as idiopathic scoliosis. Has been wearing an abdominal binder x at least 3 years w/good result. Wears the binder if he is going to be out and walking a long time. PLOF:  Ind. w/mobility and self-care, works out at [facility] [employer]). Due to SVT's, unable to walk on treadmill. Otherwise, is weight lifting and riding the bike w/o problem. Worse/Better:  Shoulder:  Raising RUE overhead, lifting./rest and not raising arm above shoulder height. Back:  Walking distances/sitting        Subjective:   COVID screening questions were asked and patient attested that there had been no contact or symptoms. Pt reporting significant increase in BUE use w/increased ROM and strength. Was able to assist w/tossing light building materials (packs of nails) to a  at home. Able to throw it to the roof w/o pain (demo's underhand.)  Pt continues to work out at the [employer]; was able to row 66# w/o difficulty,  77# leg press. Any changes in Ambulatory Summary Sheet? None        Objective:     Pelvic obliquity  Left lateral trunk position (scoliosis)      shoulder flexion in plane of scaption = 98 deg    Supine AROM w/1# cane   deg  ABD 90 deg      Exercises: (No more than 4 columns)   Exercise/Equipment Date 3/26/21 #1 Date 4/3/2021 Date  4/6/21 4/9/21 #4 4/12/21 4/13/21#6 4/20/21 #7       See below for plan for treatment session        WARM UP          Nustep     S10/A11 Lv4 7' S10/A11 Lv4 7' S10/A11 Lv5 5'    Towel for lumbar stability S10/A11 Lv5 5'    Towel for lumbar stability S10/A11 Lv5 5'    Towel for lumbar stability S10/A11 Lv5  8. 5'    Towel for lumbar stability   Supine cane flex/ER   5 ct x 10 ea. 5 ct x 10 ea. 5 ct x 10 ea.  5 ct x 10 ea   Pulleys Flex  10x5\"  At end  10x 5\"      TABLE          R shld PROM  Flex/ABD/ER Flex/ABD/ER Flex/ABD/ER      AAROM flex hand clasp   10x5\" 10 x 5\"  10 x 5\" 10 x 5\"   punches    10 x R  10 x 1#    AAROM PNF flex    10 x       TA   5 ct x 10 5 laterally, idiopathic scoliosis, chronic right shoulder/arm pain. Pt reports chronic right shoulder and back pain. 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 Also, reports back and right > left hip pain x many years as well as idiopathic scoliosis. Has been wearing an abdominal binder x at least 3 years w/good result. Wears the binder if he is going to be out and walking a long time. PLOF:  Ind. w/mobility and self-care, works out at [employer]. Due to SVT's, unable to walk on treadmill. Otherwise, is weight lifting and riding the bike w/o problem. Worse/Better:  Shoulder:  Raising RUE overhead, lifting./rest and not raising arm above shoulder height.   Back:  Walking distances/sitting      Plan for Next Session: Specific instructions for Next Treatment: Shoulder mobes to increase posterior and inferior translation, postural strengthening;  lumbar stabilization and BLE ROM (stretching and strengthening) has tight hamstrings and hip flexors (R>L), tight ITB bilat      Time In / Time Out:  1118/1200      Timed Code/Total Treatment Minutes:   42'/42'  TE 30' (2), NRE 12' (1)      Next Progress Note due:   4/23/21 (probable discharge)      Plan of Care Interventions:  [x] Therapeutic Exercise  [x] Modalities:  [x] Therapeutic Activity     [] Ultrasound  [] Estim  [] Gait Training      [] Cervical Traction [] Lumbar Traction  [x] Neuromuscular Re-education    [x] Cold/hotpack [] Iontophoresis   [x] Instruction in HEP      [x] Vasopneumatic   [] Dry Needling    [x] Manual Therapy               [] Aquatic Therapy              Electronically signed by:  Diana Guallpa, PT 4/20/2021, 11:19 AM

## 2021-04-23 ENCOUNTER — HOSPITAL ENCOUNTER (OUTPATIENT)
Dept: PHYSICAL THERAPY | Age: 81
Setting detail: THERAPIES SERIES
Discharge: HOME | End: 2021-04-23
Payer: MEDICARE

## 2021-04-23 PROCEDURE — 97110 THERAPEUTIC EXERCISES: CPT

## 2021-04-23 NOTE — FLOWSHEET NOTE
Outpatient Physical Therapy  [place]           [x] Phone: [phone]   Fax: [phone]  [place]           [] Phone: [phone]   Fax: [phone]        Physical Therapy Daily Treatment Note  DISCHARGE  Date:  2021    Patient Name:  Colonel Mcdonnell    :  1940  MRN: [id number]  Restrictions/Precautions: PACEMAKER. NO E-STIM OR US; NO MECHANICAL TRACTION  Diagnosis:   Diagnosis: Chronic LBP w/ sciatica laterally, idiopathic scoliosis, chronic right shoulder/arm pain  Date of Injury/Surgery: Chronic  Treatment Diagnosis: Treatment Diagnosis: LBP w/forward bending and gait, right shoulder pain w weakness and impaired ROM/functional use, tight hip flexors, ITB, hamstrings    Insurance/Certification information: PT Insurance Information: Medicare   Referring Physician:  Referring Practitioner: Frankie Cowden  Next Doctor Visit:  Unknown  Plan of care signed (Y/N): Pending    Outcome Measure:   OSW (14% disability)  QD (29.5% disability)  21 OSW (14% disability)  21 QD (4.5% disability)    Visit# / total visits:    Pain level: 0/10 R shld;  0/10 LBP    POC Date Range 3/26/21 - 21    Goals:          Long term goals  Time Frame for Long term goals : In 4 weeks, patient will  Long term goal 1: demonstrate compliance and independence w/HEP. - MET 21  Long term goal 2: score <= 10% disability on OSW LBPQ as indication of improved function w/daily activities. Long term goal 3: score <= 20% disability on Quick Dash as indication of improved function w/daily activities  Long term goal 4: demonstrate ability to raise right shoulder at least 110 deg w/10# weight in hand and place weight on shelf. Summary of Evaluation: Assessment: Pt is an 79 y.o. right hand dominant male w/DXChronic LBP w/ sciatica laterally, idiopathic scoliosis, chronic right shoulder/arm pain. Pt reports chronic right shoulder and back pain.   Per pt, had right bicep partial rupture (no surgical repair) and AAROM flex hand clasp 10x5\" 10 x 5\"  10 x 5\" 10 x 5\"    punches  10 x R  10 x 1#     AAROM PNF flex  10 x        TA 5 ct x 10 5 ct x 10       Marches + TA X 10  X 10       HS stretch manual 2x30\" B 2x30\" B       Hip flexor stretch 2x30\" B 2x30\" B       Scap ret   2 x 10  Sitting  YTB  Vc/demo shoulder depression  2 x 10  Sitting  GTB  Minimal vc for right shoulder depression. 2 x 10  Sitting  GTB  Minimal vc for right shoulder depression. UT and levator stretch   2 x 15 sec  Ea  Bilat      piriformis stretch     30 s x 2 30s x 2    QL stretch    30 s ea. HS stretch contract relax         Seated shoulder depression twd floor  neck side bend with towel behind back    30 s x 2 15s x 2  GTB    SL ER        X 10 1#  Sitting shoulder ER  YTB  Towel under R UE  4 x 5 reps  Vc/tc/demo form. Improved w/practice   SL abd    X 10     STANDING    30 s x 3              Mid/low row  X 10 YTB  Does mid row on the machina at gym   X 10 GTB X 10 GTB                                                     PROPRIOCEPTION         ABC    1# x 2                                         MODALITIES                               Other Therapeutic Activities/Education:    POC and treatment progression expected reviewed w/patient, as well as underlying pathology related to dysfunction and pain. Home Exercise Program:    4/6/21 AAROM flex hand clasp, cane ER  4/9/21 low row, mid row YTB  4/13/21 Piriformis stretch, UT stretch, ER stretch w cane    Manual Treatments:    R shoulder posterior/inferior glides/gentle mobilization in supine as well as PROM. Pelvic MET     Modalities:    No    Communication with other providers:    POC faxed to ordering provider. Assessment:  (Response towards treatment session) (Pain Rating)    End Pain = 0/10 LB, 0/10 shoulder  Patient has had 8 physical therapy treatments from 3/26/21 - 4/23/21   Improved QD/unchanged OSW, however pt reports no back pain x at least 1 week.   Eval OSW (14%

## 2021-05-04 ENCOUNTER — HOSPITAL ENCOUNTER (OUTPATIENT)
Dept: PHYSICAL THERAPY | Age: 81
Setting detail: THERAPIES SERIES
Discharge: HOME | End: 2021-05-04

## 2021-05-04 PROCEDURE — 9990000042 HC WELLNESS CENTER INDV PER MONTH

## 2021-06-16 ENCOUNTER — PROCEDURE VISIT (OUTPATIENT)
Dept: PULMONOLOGY | Age: 81
End: 2021-06-16
Payer: MEDICARE

## 2021-06-16 DIAGNOSIS — J45.20 MILD INTERMITTENT ASTHMA WITHOUT COMPLICATION: Primary | ICD-10-CM

## 2021-06-16 DIAGNOSIS — R06.02 SHORTNESS OF BREATH: ICD-10-CM

## 2021-06-16 DIAGNOSIS — J45.20 MILD INTERMITTENT ASTHMA WITHOUT COMPLICATION: ICD-10-CM

## 2021-06-16 LAB
EXPIRATORY TIME-POST: NORMAL
EXPIRATORY TIME: NORMAL
FEF 25-75% %CHNG: NORMAL
FEF 25-75% %PRED-POST: NORMAL
FEF 25-75% %PRED-PRE: NORMAL
FEF 25-75% PRED: NORMAL
FEF 25-75%-POST: NORMAL
FEF 25-75%-PRE: NORMAL
FEV1 %PRED-POST: 81.6 %
FEV1 %PRED-PRE: 83.2 %
FEV1 PRED: 3.04 L
FEV1-POST: 2.48 L
FEV1-PRE: 2.53 L
FEV1/FVC %PRED-POST: 118.5 %
FEV1/FVC %PRED-PRE: 117.7 %
FEV1/FVC PRED: 71.1 %
FEV1/FVC-POST: 84.2 %
FEV1/FVC-PRE: 83.7 %
FVC %PRED-POST: 68.8 L
FVC %PRED-PRE: 70.6 %
FVC PRED: 4.28 L
FVC-POST: 2.95 L
FVC-PRE: 3.02 L
PEF %PRED-POST: NORMAL
PEF %PRED-PRE: NORMAL
PEF PRED: NORMAL
PEF%CHNG: NORMAL
PEF-POST: NORMAL
PEF-PRE: NORMAL

## 2021-06-16 PROCEDURE — G8427 DOCREV CUR MEDS BY ELIG CLIN: HCPCS | Performed by: INTERNAL MEDICINE

## 2021-06-16 PROCEDURE — 1123F ACP DISCUSS/DSCN MKR DOCD: CPT | Performed by: INTERNAL MEDICINE

## 2021-06-16 PROCEDURE — 99213 OFFICE O/P EST LOW 20 MIN: CPT | Performed by: INTERNAL MEDICINE

## 2021-06-16 PROCEDURE — 1036F TOBACCO NON-USER: CPT | Performed by: INTERNAL MEDICINE

## 2021-06-16 PROCEDURE — G8420 CALC BMI NORM PARAMETERS: HCPCS | Performed by: INTERNAL MEDICINE

## 2021-06-16 PROCEDURE — 4040F PNEUMOC VAC/ADMIN/RCVD: CPT | Performed by: INTERNAL MEDICINE

## 2021-06-16 RX ORDER — ALBUTEROL SULFATE 90 UG/1
2 AEROSOL, METERED RESPIRATORY (INHALATION) EVERY 6 HOURS PRN
Qty: 3 INHALER | Refills: 3 | Status: SHIPPED | OUTPATIENT
Start: 2021-06-16

## 2021-06-16 ASSESSMENT — PULMONARY FUNCTION TESTS
FVC_PRE: 3.02
FEV1_PREDICTED: 3.04
FVC_POST: 2.95
FVC_PERCENT_PREDICTED_POST: 68.8
FEV1/FVC_PRE: 83.7
FEV1/FVC_PREDICTED: 71.1
FVC_PREDICTED: 4.28
FEV1/FVC_POST: 84.2
FEV1/FVC_PERCENT_PREDICTED_PRE: 117.7
FEV1_POST: 2.48
FEV1_PRE: 2.53
FVC_PERCENT_PREDICTED_PRE: 70.6
FEV1/FVC_PERCENT_PREDICTED_POST: 118.5
FEV1_PERCENT_PREDICTED_POST: 81.6
FEV1_PERCENT_PREDICTED_PRE: 83.2

## 2021-06-16 NOTE — PROGRESS NOTES
CHIEF COMPLIANT:  Tanya Devi presents to the pulmonary clinic today for evaluation, spirometry testing, review of testing results and pulmonary medications. His major complaint today is mild intermittent asthma, shortness of breath    HPI: Cathleen Mueller states that he has had no major asthmatic or bronchitic attacks recently. He continues uses his albuterol rescue inhaler 2 puffs as needed and typically uses it in the morning. Infrequently he does have to use it later in the day if he is in windy conditions or cold air. He has had no known COVID-19 exposure or infection and has received both Moderna COVID-19 vaccinations    Physical Exam:  Constitutional:  He appears well developed and well-nourished. Respiratory: No acute respiratory distress noted  Neurologic: Oriented x3, normal speech    OFFICE SPIROMETRY:  Yehuda Vance demonstrates an FEV1 of 2.53 L with an FVC of 3.02 liters. He demonstrates no significant obstructive lung defect. He shows no significant response to bronchodilators. Overall, his lung function has minimally decreased over the past year. ASSESSMENT:    1. Mild intermittent asthma without complication    2. Shortness of breath          PLAN:   I did renew his albuterol rescue inhaler. I will make no other changes in his current therapy. I will continue to follow him      We have discussed the need to maintain yearly flu immunization, pneumococcal vaccination and coronavirus vaccination. We have discussed Coronavirus precaution including handwashing practice, wiping items touched in public such as gas pumps, door handles, shopping carts, etc. Self monitoring for infection - fever, chills, cough, SOB. Should they develop symptoms they should call office for further instructions. Return in about 6 months (around 12/16/2021) for Recheck for Asthma, Recheck for Shortness of Breath.       This dictation was performed with a verbal recognition program and it was checked for

## 2021-07-07 ENCOUNTER — HOSPITAL ENCOUNTER (OUTPATIENT)
Dept: PHYSICAL THERAPY | Age: 81
Setting detail: THERAPIES SERIES
Discharge: HOME | End: 2021-07-07

## 2021-07-07 PROCEDURE — 9990000042 HC WELLNESS CENTER INDV PER MONTH

## 2021-08-03 ENCOUNTER — HOSPITAL ENCOUNTER (OUTPATIENT)
Dept: PHYSICAL THERAPY | Age: 81
Setting detail: THERAPIES SERIES
Discharge: HOME | End: 2021-08-03

## 2021-08-03 PROCEDURE — 9990000042 HC WELLNESS CENTER INDV PER MONTH

## 2021-08-14 PROBLEM — Z98.890 STATUS POST HEMORRHOIDECTOMY: Status: ACTIVE | Noted: 2021-08-14

## 2021-08-14 PROBLEM — Z87.19 STATUS POST HEMORRHOIDECTOMY: Status: ACTIVE | Noted: 2021-08-14

## 2021-09-02 ENCOUNTER — HOSPITAL ENCOUNTER (OUTPATIENT)
Dept: PHYSICAL THERAPY | Age: 81
Discharge: HOME | End: 2021-09-02

## 2021-09-02 PROCEDURE — 9990000042 HC WELLNESS CENTER INDV PER MONTH

## 2021-10-04 ENCOUNTER — HOSPITAL ENCOUNTER (OUTPATIENT)
Dept: PHYSICAL THERAPY | Age: 81
Discharge: HOME | End: 2021-10-04

## 2021-10-04 PROCEDURE — 9990000042 HC WELLNESS CENTER INDV PER MONTH

## 2021-11-04 ENCOUNTER — HOSPITAL ENCOUNTER (OUTPATIENT)
Dept: PHYSICAL THERAPY | Age: 81
Discharge: HOME | End: 2021-11-04

## 2021-11-04 PROCEDURE — 9990000042 HC WELLNESS CENTER INDV PER MONTH

## 2021-12-01 ENCOUNTER — HOSPITAL ENCOUNTER (INPATIENT)
Dept: CARDIAC CATH/INVASIVE PROCEDURES | Age: 81
LOS: 1 days | Discharge: HOME | DRG: 287 | End: 2021-12-02
Attending: INTERNAL MEDICINE | Admitting: INTERNAL MEDICINE
Payer: MEDICARE

## 2021-12-01 ENCOUNTER — APPOINTMENT (OUTPATIENT)
Dept: ULTRASOUND IMAGING | Age: 81
DRG: 287 | End: 2021-12-01
Attending: INTERNAL MEDICINE
Payer: MEDICARE

## 2021-12-01 PROBLEM — I95.9 HYPOTENSION: Status: ACTIVE | Noted: 2021-12-01

## 2021-12-01 LAB
ACTIVATED CLOTTING TIME, LOW RANGE: 287 SEC
ACTIVATED CLOTTING TIME, LOW RANGE: 366 SEC
ANION GAP SERPL CALCULATED.3IONS-SCNC: 11 MMOL/L (ref 4–16)
APTT: 36 SECONDS (ref 25.1–37.1)
APTT: 99.3 SECONDS (ref 25.1–37.1)
BASOPHILS ABSOLUTE: 0 K/CU MM
BASOPHILS RELATIVE PERCENT: 0 % (ref 0–1)
BUN BLDV-MCNC: 29 MG/DL (ref 6–23)
CALCIUM SERPL-MCNC: 9.2 MG/DL (ref 8.3–10.6)
CHLORIDE BLD-SCNC: 104 MMOL/L (ref 99–110)
CO2: 25 MMOL/L (ref 21–32)
CREAT SERPL-MCNC: 1.2 MG/DL (ref 0.9–1.3)
DIFFERENTIAL TYPE: ABNORMAL
EOSINOPHILS ABSOLUTE: 0 K/CU MM
EOSINOPHILS RELATIVE PERCENT: 0.9 % (ref 0–3)
GFR AFRICAN AMERICAN: >60 ML/MIN/1.73M2
GFR NON-AFRICAN AMERICAN: 58 ML/MIN/1.73M2
GLUCOSE BLD-MCNC: 94 MG/DL (ref 70–99)
HCT VFR BLD CALC: 43.6 % (ref 42–52)
HCT VFR BLD CALC: 47.4 % (ref 42–52)
HCT VFR BLD CALC: ABNORMAL % (ref 42–52)
HEMOGLOBIN: 14 GM/DL (ref 13.5–18)
HEMOGLOBIN: 15.1 GM/DL (ref 13.5–18)
HEMOGLOBIN: ABNORMAL GM/DL (ref 13.5–18)
IMMATURE NEUTROPHIL %: 0.4 % (ref 0–0.43)
INR BLD: 0.89 INDEX
INR BLD: 1.65 INDEX
LYMPHOCYTES ABSOLUTE: 0.4 K/CU MM
LYMPHOCYTES RELATIVE PERCENT: 17.5 % (ref 24–44)
MCH RBC QN AUTO: 30.8 PG (ref 27–31)
MCH RBC QN AUTO: 30.9 PG (ref 27–31)
MCH RBC QN AUTO: ABNORMAL PG (ref 27–31)
MCHC RBC AUTO-ENTMCNC: 31.9 % (ref 32–36)
MCHC RBC AUTO-ENTMCNC: 32.1 % (ref 32–36)
MCHC RBC AUTO-ENTMCNC: ABNORMAL % (ref 32–36)
MCV RBC AUTO: 95.8 FL (ref 78–100)
MCV RBC AUTO: 97.1 FL (ref 78–100)
MCV RBC AUTO: ABNORMAL FL (ref 78–100)
MONOCYTES ABSOLUTE: 0.1 K/CU MM
MONOCYTES RELATIVE PERCENT: 5.2 % (ref 0–4)
NUCLEATED RBC %: 0 %
PDW BLD-RTO: 12.6 % (ref 11.7–14.9)
PDW BLD-RTO: 12.6 % (ref 11.7–14.9)
PDW BLD-RTO: ABNORMAL % (ref 11.7–14.9)
PLATELET # BLD: 178 K/CU MM (ref 140–440)
PLATELET # BLD: 180 K/CU MM (ref 140–440)
PLATELET # BLD: ABNORMAL K/CU MM (ref 140–440)
PMV BLD AUTO: 9.4 FL (ref 7.5–11.1)
PMV BLD AUTO: 9.7 FL (ref 7.5–11.1)
PMV BLD AUTO: ABNORMAL FL (ref 7.5–11.1)
POTASSIUM SERPL-SCNC: 3.9 MMOL/L (ref 3.5–5.1)
PROTHROMBIN TIME: 11.5 SECONDS (ref 11.7–14.5)
PROTHROMBIN TIME: 21.4 SECONDS (ref 11.7–14.5)
RBC # BLD: 4.55 M/CU MM (ref 4.6–6.2)
RBC # BLD: 4.88 M/CU MM (ref 4.6–6.2)
RBC # BLD: ABNORMAL M/CU MM (ref 4.6–6.2)
SARS-COV-2, NAAT: NOT DETECTED
SEGMENTED NEUTROPHILS ABSOLUTE COUNT: 1.7 K/CU MM
SEGMENTED NEUTROPHILS RELATIVE PERCENT: ABNORMAL % (ref 36–66)
SODIUM BLD-SCNC: 140 MMOL/L (ref 135–145)
SOURCE: NORMAL
TOTAL IMMATURE NEUTOROPHIL: 0.01 K/CU MM
TOTAL NUCLEATED RBC: 0 K/CU MM
WBC # BLD: 6.4 K/CU MM (ref 4–10.5)
WBC # BLD: 7.2 K/CU MM (ref 4–10.5)
WBC # BLD: ABNORMAL K/CU MM (ref 4–10.5)

## 2021-12-01 PROCEDURE — 6370000000 HC RX 637 (ALT 250 FOR IP)

## 2021-12-01 PROCEDURE — B2111ZZ FLUOROSCOPY OF MULTIPLE CORONARY ARTERIES USING LOW OSMOLAR CONTRAST: ICD-10-PCS | Performed by: INTERNAL MEDICINE

## 2021-12-01 PROCEDURE — 6360000002 HC RX W HCPCS

## 2021-12-01 PROCEDURE — 4A023N8 MEASUREMENT OF CARDIAC SAMPLING AND PRESSURE, BILATERAL, PERCUTANEOUS APPROACH: ICD-10-PCS | Performed by: INTERNAL MEDICINE

## 2021-12-01 PROCEDURE — C1892 INTRO/SHEATH,FIXED,PEEL-AWAY: HCPCS

## 2021-12-01 PROCEDURE — 6370000000 HC RX 637 (ALT 250 FOR IP): Performed by: INTERNAL MEDICINE

## 2021-12-01 PROCEDURE — 93926 LOWER EXTREMITY STUDY: CPT

## 2021-12-01 PROCEDURE — 85347 COAGULATION TIME ACTIVATED: CPT

## 2021-12-01 PROCEDURE — C1725 CATH, TRANSLUMIN NON-LASER: HCPCS

## 2021-12-01 PROCEDURE — 87635 SARS-COV-2 COVID-19 AMP PRB: CPT

## 2021-12-01 PROCEDURE — 85610 PROTHROMBIN TIME: CPT

## 2021-12-01 PROCEDURE — 93460 R&L HRT ART/VENTRICLE ANGIO: CPT

## 2021-12-01 PROCEDURE — 80048 BASIC METABOLIC PNL TOTAL CA: CPT

## 2021-12-01 PROCEDURE — 85730 THROMBOPLASTIN TIME PARTIAL: CPT

## 2021-12-01 PROCEDURE — C1760 CLOSURE DEV, VASC: HCPCS

## 2021-12-01 PROCEDURE — C1769 GUIDE WIRE: HCPCS

## 2021-12-01 PROCEDURE — 2709999900 HC NON-CHARGEABLE SUPPLY

## 2021-12-01 PROCEDURE — 6360000004 HC RX CONTRAST MEDICATION

## 2021-12-01 PROCEDURE — B245ZZ4 ULTRASONOGRAPHY OF LEFT HEART, TRANSESOPHAGEAL: ICD-10-PCS | Performed by: INTERNAL MEDICINE

## 2021-12-01 PROCEDURE — 6360000002 HC RX W HCPCS: Performed by: INTERNAL MEDICINE

## 2021-12-01 PROCEDURE — B2131ZZ FLUOROSCOPY OF MULTIPLE CORONARY ARTERY BYPASS GRAFTS USING LOW OSMOLAR CONTRAST: ICD-10-PCS | Performed by: INTERNAL MEDICINE

## 2021-12-01 PROCEDURE — 85025 COMPLETE CBC W/AUTO DIFF WBC: CPT

## 2021-12-01 PROCEDURE — 2500000003 HC RX 250 WO HCPCS

## 2021-12-01 PROCEDURE — 2580000003 HC RX 258: Performed by: INTERNAL MEDICINE

## 2021-12-01 PROCEDURE — C1751 CATH, INF, PER/CENT/MIDLINE: HCPCS

## 2021-12-01 PROCEDURE — 85027 COMPLETE CBC AUTOMATED: CPT

## 2021-12-01 PROCEDURE — C1894 INTRO/SHEATH, NON-LASER: HCPCS

## 2021-12-01 PROCEDURE — 7100000001 HC PACU RECOVERY - ADDTL 15 MIN

## 2021-12-01 PROCEDURE — C1887 CATHETER, GUIDING: HCPCS

## 2021-12-01 PROCEDURE — 7100000000 HC PACU RECOVERY - FIRST 15 MIN

## 2021-12-01 PROCEDURE — 51702 INSERT TEMP BLADDER CATH: CPT

## 2021-12-01 PROCEDURE — 2000000000 HC ICU R&B

## 2021-12-01 PROCEDURE — B2161ZZ FLUOROSCOPY OF RIGHT AND LEFT HEART USING LOW OSMOLAR CONTRAST: ICD-10-PCS | Performed by: INTERNAL MEDICINE

## 2021-12-01 PROCEDURE — 93312 ECHO TRANSESOPHAGEAL: CPT

## 2021-12-01 RX ORDER — ROSUVASTATIN CALCIUM 5 MG/1
10 TABLET, COATED ORAL NIGHTLY
Status: DISCONTINUED | OUTPATIENT
Start: 2021-12-01 | End: 2021-12-02

## 2021-12-01 RX ORDER — ATROPINE SULFATE 0.4 MG/ML
0.5 AMPUL (ML) INJECTION
Status: ACTIVE | OUTPATIENT
Start: 2021-12-01 | End: 2021-12-01

## 2021-12-01 RX ORDER — FUROSEMIDE 10 MG/ML
20 INJECTION INTRAMUSCULAR; INTRAVENOUS ONCE
Status: COMPLETED | OUTPATIENT
Start: 2021-12-01 | End: 2021-12-01

## 2021-12-01 RX ORDER — DIPHENHYDRAMINE HCL 25 MG
25 TABLET ORAL ONCE
Status: DISCONTINUED | OUTPATIENT
Start: 2021-12-01 | End: 2021-12-02 | Stop reason: HOSPADM

## 2021-12-01 RX ORDER — MIDODRINE HYDROCHLORIDE 5 MG/1
10 TABLET ORAL
Status: DISCONTINUED | OUTPATIENT
Start: 2021-12-01 | End: 2021-12-02 | Stop reason: HOSPADM

## 2021-12-01 RX ORDER — SODIUM CHLORIDE 0.9 % (FLUSH) 0.9 %
5-40 SYRINGE (ML) INJECTION EVERY 12 HOURS SCHEDULED
Status: DISCONTINUED | OUTPATIENT
Start: 2021-12-01 | End: 2021-12-02 | Stop reason: HOSPADM

## 2021-12-01 RX ORDER — CLOPIDOGREL BISULFATE 75 MG/1
75 TABLET ORAL NIGHTLY
Status: DISCONTINUED | OUTPATIENT
Start: 2021-12-01 | End: 2021-12-02 | Stop reason: HOSPADM

## 2021-12-01 RX ORDER — SODIUM CHLORIDE 9 MG/ML
25 INJECTION, SOLUTION INTRAVENOUS PRN
Status: DISCONTINUED | OUTPATIENT
Start: 2021-12-01 | End: 2021-12-02 | Stop reason: HOSPADM

## 2021-12-01 RX ORDER — LEVOTHYROXINE SODIUM 0.1 MG/1
100 TABLET ORAL NIGHTLY
Status: DISCONTINUED | OUTPATIENT
Start: 2021-12-01 | End: 2021-12-02 | Stop reason: HOSPADM

## 2021-12-01 RX ORDER — SODIUM CHLORIDE 0.9 % (FLUSH) 0.9 %
5-40 SYRINGE (ML) INJECTION PRN
Status: DISCONTINUED | OUTPATIENT
Start: 2021-12-01 | End: 2021-12-02 | Stop reason: HOSPADM

## 2021-12-01 RX ORDER — LIDOCAINE HYDROCHLORIDE 20 MG/ML
15 SOLUTION OROPHARYNGEAL ONCE
Status: COMPLETED | OUTPATIENT
Start: 2021-12-01 | End: 2021-12-01

## 2021-12-01 RX ORDER — SODIUM CHLORIDE 9 MG/ML
INJECTION, SOLUTION INTRAVENOUS CONTINUOUS
Status: DISCONTINUED | OUTPATIENT
Start: 2021-12-01 | End: 2021-12-01

## 2021-12-01 RX ORDER — SODIUM CHLORIDE 9 MG/ML
INJECTION, SOLUTION INTRAVENOUS CONTINUOUS
Status: DISCONTINUED | OUTPATIENT
Start: 2021-12-01 | End: 2021-12-02

## 2021-12-01 RX ORDER — FINASTERIDE 5 MG/1
5 TABLET, FILM COATED ORAL NIGHTLY
Status: DISCONTINUED | OUTPATIENT
Start: 2021-12-01 | End: 2021-12-02 | Stop reason: HOSPADM

## 2021-12-01 RX ADMIN — CLOPIDOGREL BISULFATE 75 MG: 75 TABLET ORAL at 20:41

## 2021-12-01 RX ADMIN — FUROSEMIDE 20 MG: 10 INJECTION, SOLUTION INTRAMUSCULAR; INTRAVENOUS at 15:27

## 2021-12-01 RX ADMIN — MIDODRINE HYDROCHLORIDE 10 MG: 5 TABLET ORAL at 17:54

## 2021-12-01 RX ADMIN — SODIUM CHLORIDE: 9 INJECTION, SOLUTION INTRAVENOUS at 22:04

## 2021-12-01 RX ADMIN — SODIUM CHLORIDE: 9 INJECTION, SOLUTION INTRAVENOUS at 17:50

## 2021-12-01 RX ADMIN — ROSUVASTATIN CALCIUM 10 MG: 5 TABLET, COATED ORAL at 20:41

## 2021-12-01 RX ADMIN — FINASTERIDE 5 MG: 5 TABLET, FILM COATED ORAL at 20:41

## 2021-12-01 RX ADMIN — LIDOCAINE HYDROCHLORIDE 15 ML: 20 SOLUTION ORAL; TOPICAL at 12:05

## 2021-12-01 RX ADMIN — LEVOTHYROXINE SODIUM 100 MCG: 0.1 TABLET ORAL at 20:41

## 2021-12-01 ASSESSMENT — PAIN SCALES - GENERAL
PAINLEVEL_OUTOF10: 0

## 2021-12-01 NOTE — PROGRESS NOTES
Dr Oumar Roth notified and made aware of hematoma around venous sheath at procedure site. Venous sheath pulled manual pressure held by [address]. Hematoma softened. Pt coughing up small amount of blood into tissue. VSS.

## 2021-12-01 NOTE — OP NOTE
Operative Note      Patient: Mireille Cerrato  YOB: 1940  MRN: [id number]    Date of Procedure:   12/1/21  Pre-Op Diagnosis: CAD and unstable angina   Post-Op Diagnosis: Same    Estimated Blood Loss (mL): less than 50     Complications: None    Electronically signed by Adama Ruby MD on 12/1/2021 at 2:19 PM    DICTATED ---[id number]  LEFT MAIN PATENT  LAD % INSTENT STENOSIS -UNABLE TO CROSS WIRE- NOTED  LCX PROXIMAL 90% STENOSIS UNCHANGED  RCA DISTAL 50%-70% STENOSIS -UNCHANGED  LIMA ATRECTIC  SVG TO OM PATENT  SVG TO DIAGONAL PATENT  SVG TO PDA PATENT, FILLS PLB ALSO-HAS 50-70% STENOSIS IN PLB BRANCH   LVEDP 10  MODERATE TO SEVERE AS-35MM  HG  NORMAL RIGHT HEART PRESSURES  OPTIMIZE MEDICAL TREATMENT    RIGHT FEMORAL ARTERIAL AND VENOUS SHEATH  NO COMPLICATIONS  HOME LATER TODAY  ANGIOSEAL PLACED IN RIGHT GROIN    Electronically signed by Adama Ruby MD on 12/1/21 at 2:37 PM EST

## 2021-12-01 NOTE — PROCEDURES
[address] [place] [address], [address] [place] [address]                            CARDIAC CATHETERIZATION    PATIENT NAME: Lamberto Brooks                  :        1940  MED REC NO:   [id number]                          ROOM:  ACCOUNT NO:   [de-identified]                           ADMIT DATE: 2021  PROVIDER:     Rachid Jones MD    DATE OF PROCEDURE:  2021    INDICATION:  Chest pain and shortness breath. This is an 58-year-old male patient brought to cath lab today. Informed  consent was obtained from the patient. The patient was prepped and  draped in the usual sterile fashion. The patient was injected with 20  mL of 2% lidocaine in the right femoral artery region. Using a  micropuncture needle, the right femoral artery was cannulized and a  6-British sheath was placed in the right femoral artery and a 5-British  venous sheath was placed in the right femoral vein. Right heart catheterization was performed. RV pressure is 38/2 with a  mean of 2. PA pressure is 31/9 with a mean of 18. Pulmonary capillary  wedge pressure is 12/19 with a mean of 13 present. There is about a 35  mmHg of gradient noted across the aortic valve. EDP is around 10 mmHg  present. Using a JL-5, 4-British catheter, left coronary angiography was  performed. Left coronary angiogram revealed the left main is patent. It bifurcates into LAD and circumflex artery. The circ is proximally  100% occluded. There is a large OM branch present. It is filling  through the graft. LAD in its mid is 100% occluded, in-stent stenosis  noted. ANDREZ-0 flow noted. There is SVG graft to the PDA present. SVG  graft to the PDA is patent. It also fills the PLB branch. It fills  both the branches. The PLB branch has about 50-70% stenosis present. Native right coronary artery is patent. The distal right coronary  artery has a 70% stenosis present.   With also has a flow to the PD and  the PL branch. There is a competitive flow noted from the graft also. SVG graft to OM was patent. The SVG graft to diagonal branch is also patent. The LIMA is noted to  be occluded. IMPRESSION:  1. Left main is patent. 2.  LAD has mid 100% in-stent stenosis noted. ANDREZ-0 flow present. 3.  Circ is proximally 100% occluded, which is unchanged from before. The OM graft is filling from the competitive flow. 4.  Right coronary artery has a distal 50-70% stenosis present, which is  unchanged from before. 5.  SVG to PDA is patent. It also fills the PLB branch and the PLB  branch has 50-70% stenosis noted. 6.  SVG to OM is patent. 7.  SVG to diagonal is patent. LIMA is noted to be atretic. 8.  There is moderate-to-severe aortic stenosis noted. Mean gradient of  35 mmHg present. The plan is to proceed with intervention of the LAD. The patient already had a 6-Sinhala sheath. The patient was  anticoagulated with heparin. ACT was kept greater than 250. Then, using a VL-4 guide, the left main is engaged. A BMW wire, a  Runthrough wire, and a Whisper wire, all were used and a support  catheter was used. We were unable to cross the . It was 100%  occluded in the mid LAD. A support catheter and support balloon both  were used. We were unable to cross into LAD. After multiple attempts,  the procedure was aborted. An Angio-Seal closure device was placed. IMPRESSION:  1. Left main is patent. 2.  LAD is mid 100% occluded, in-stent stenosis present. An attempt was  made to do the PCI of the LAD, unable to cross the wire. Appears to be  a  present. 3.  Circ has proximal 90% stenosis noted. 4.  The right coronary artery has distal 50-70% stenosis present, which  is unchanged from before. 5.  SVG to PDA is patent; it fills the PLB branch. The PLB has 50-70%  stenosis noted, which unchanged. 6.  SVG to OM is patent. 7.  SVG to diagonal is patent.   LIMA is noted to be atretic. 8.  There is moderate-to-severe aortic stenosis noted. At this time, we will optimize the medical treatment. We will  reevaluate him. We will make further recommendations to see if the  patient will be a candidate for aortic stenosis evaluation also. The patient's pacer was checked. No runs of arrhythmias were noted. The patient is ventricularly paced.     Blood loss 20cc  Cardiac rehab consulted      Linda Gallegos MD    D: 12/01/2021 14:35:14       T: 12/01/2021 14:38:27     [email or web address]  Job#: [id number]     Doc#: [id number]    CC:

## 2021-12-01 NOTE — PROGRESS NOTES
Upon procedure site checked,  Found to have increasing swelling of R groin. Manual pressure held. Dr Shira Chavis notified.

## 2021-12-01 NOTE — PROGRESS NOTES
CBC redrawn. Ultrasound at bedside. Dr Valentine Back contacting wife and updating on patient's condition.

## 2021-12-01 NOTE — PROGRESS NOTES
Hemoglobin came back  Rechecking labs again  Admitting for now  Transfuse if needed  Update wife Melinda Keen

## 2021-12-01 NOTE — H&P
Dictated --[id number]  Right and left heart cath  NANETTE   ASA 3  Mal 3    Electronically signed by Freida Bishop MD on 12/1/21 at 10:16 AM EST

## 2021-12-01 NOTE — H&P
[address], [address] [facility]                       PREOPERATIVE HISTORY AND PHYSICAL    PATIENT NAME: Samantha Scott                  :        1940  MED REC NO:   [id number]                          ROOM:  ACCOUNT NO:   [de-identified]                           ADMIT DATE: 2021  PROVIDER:     Chiara Gayle MD    INDICATION:  Aortic stenosis and dyspnea. HISTORY OF PRESENT ILLNESS:  This is an 69-year-old male patient who  comes in for heart catheterization. He is here for right and left heart  catheterization. The patient had an echo done as outpatient. Echo  shows LV function of 40% and _____ severe aortic stenosis noted. Because of the ongoing symptoms, the patient is here for further  evaluation. The patient has also been having some palpitations present. The patient has dyspnea with minimal exertion present. No syncopal  episode present. The patient had a heart catheterization done in 2017. Left main is  patent. LAD proximal 80% stenosis, which was stented. Circ has a  proximal 99% stenosis. RCA mid and distal balloon PTA was performed. Unable to put a stent in the RCA. A balloon angioplasty was performed. SVG to diagonal was patent. SVG to OM was patent. LIMA to LAD was  atretic. SVG to PDA was 80% stenosis, which was stented also. PAST MEDICAL HISTORY:  History of coronary artery disease status post  CABG done. He has a four-vessel bypass surgery done. It was a LIMA to  LAD, which was atretic. SVG to diagonal is patent. SVG to OM is  patent. SVG to PDA was stented. History of hypertension,  hyperlipidemia. History of having atrial fibrillation present. He has  a dual-chamber pacer present. Sick sinus syndrome, Medtronic device. Hypertension, hyperlipidemia present. Also history of asthma,  hemochromatosis, sinusitis, therapeutic phlebotomy.     PAST SURGICAL HISTORY:  Left total knee replacement; right toe surgery,  right foot surgery; tonsillectomy; _____ surgery; CABG done, four  vessels, LIMA to LAD, SVG to OM, SVG to diagonal, SVG to PDA. A  dual-chamber pacemaker present, Medtronic device. SOCIAL HISTORY:  He does not smoke and does not drink. ALLERGIES:  11, see the medication list.    MEDICATIONS AT HOME:  See the list at home. PHYSICAL EXAMINATION:  GENERAL:  The patient is awake, alert, answering questions, and not in  acute distress. VITAL SIGNS:  Temperature afebrile, pulse is 65, paced rhythm present. Blood pressure is 140/76. HEENT:  Head is normocephalic and atraumatic. Pupils are equal and  reactive. CHEST:  Equal expansion. LUNGS:  Clear to auscultation. No wheezing or rhonchi present. HEART:  Regular rhythm. ABDOMEN:  Soft and nontender. Bowel sounds are present. No  hepatosplenomegaly or guarding appreciated. EXTREMITIES:  No cyanosis noted. NEUROLOGIC:  Cranial nerves are grossly intact. LABORATORY DATA:  His labs are pending. IMPRESSION:  An 28-year-old male patient who is here for right and left  heart catheterization and NANETTE to further evaluation. ASA is III,  Mallampati is III. We will make further recommendations based on NANETTE  and right and left heart catheterization. We will get his pacer checked  also.         Fatmata Quevedo MD    D: 12/01/2021 10:16:01       T: 12/01/2021 10:18:27     [email or web address]  Job#: [id number]     Doc#: [id number]    CC:

## 2021-12-02 VITALS
BODY MASS INDEX: 20.44 KG/M2 | OXYGEN SATURATION: 87 % | SYSTOLIC BLOOD PRESSURE: 105 MMHG | HEART RATE: 75 BPM | TEMPERATURE: 97.7 F | RESPIRATION RATE: 17 BRPM | DIASTOLIC BLOOD PRESSURE: 56 MMHG | HEIGHT: 71 IN | WEIGHT: 146 LBS

## 2021-12-02 LAB
ANION GAP SERPL CALCULATED.3IONS-SCNC: 15 MMOL/L (ref 4–16)
BASOPHILS ABSOLUTE: 0 K/CU MM
BASOPHILS RELATIVE PERCENT: 0.1 % (ref 0–1)
BUN BLDV-MCNC: 26 MG/DL (ref 6–23)
CALCIUM SERPL-MCNC: 9.1 MG/DL (ref 8.3–10.6)
CHLORIDE BLD-SCNC: 99 MMOL/L (ref 99–110)
CO2: 25 MMOL/L (ref 21–32)
CREAT SERPL-MCNC: 1.2 MG/DL (ref 0.9–1.3)
DIFFERENTIAL TYPE: ABNORMAL
EOSINOPHILS ABSOLUTE: 0.1 K/CU MM
EOSINOPHILS RELATIVE PERCENT: 0.5 % (ref 0–3)
GFR AFRICAN AMERICAN: >60 ML/MIN/1.73M2
GFR NON-AFRICAN AMERICAN: 58 ML/MIN/1.73M2
GLUCOSE BLD-MCNC: 135 MG/DL (ref 70–99)
HCT VFR BLD CALC: 38.3 % (ref 42–52)
HEMOGLOBIN: 12.6 GM/DL (ref 13.5–18)
IMMATURE NEUTROPHIL %: 0.4 % (ref 0–0.43)
LYMPHOCYTES ABSOLUTE: 0.6 K/CU MM
LYMPHOCYTES RELATIVE PERCENT: 4 % (ref 24–44)
MCH RBC QN AUTO: 30.5 PG (ref 27–31)
MCHC RBC AUTO-ENTMCNC: 32.9 % (ref 32–36)
MCV RBC AUTO: 92.7 FL (ref 78–100)
MONOCYTES ABSOLUTE: 1.4 K/CU MM
MONOCYTES RELATIVE PERCENT: 9.4 % (ref 0–4)
NUCLEATED RBC %: 0 %
PDW BLD-RTO: 12.6 % (ref 11.7–14.9)
PLATELET # BLD: 188 K/CU MM (ref 140–440)
PMV BLD AUTO: 9.7 FL (ref 7.5–11.1)
POTASSIUM SERPL-SCNC: 3.7 MMOL/L (ref 3.5–5.1)
RBC # BLD: 4.13 M/CU MM (ref 4.6–6.2)
SEGMENTED NEUTROPHILS ABSOLUTE COUNT: 12.3 K/CU MM
SEGMENTED NEUTROPHILS RELATIVE PERCENT: 85.6 % (ref 36–66)
SODIUM BLD-SCNC: 139 MMOL/L (ref 135–145)
TOTAL IMMATURE NEUTOROPHIL: 0.06 K/CU MM
TOTAL NUCLEATED RBC: 0 K/CU MM
WBC # BLD: 14.4 K/CU MM (ref 4–10.5)

## 2021-12-02 PROCEDURE — 6370000000 HC RX 637 (ALT 250 FOR IP): Performed by: INTERNAL MEDICINE

## 2021-12-02 PROCEDURE — 85025 COMPLETE CBC W/AUTO DIFF WBC: CPT

## 2021-12-02 PROCEDURE — 80048 BASIC METABOLIC PNL TOTAL CA: CPT

## 2021-12-02 PROCEDURE — 2580000003 HC RX 258: Performed by: INTERNAL MEDICINE

## 2021-12-02 RX ORDER — ATENOLOL 50 MG/1
50 TABLET ORAL DAILY
Qty: 30 TABLET | Refills: 3 | Status: SHIPPED | OUTPATIENT
Start: 2021-12-02 | End: 2021-12-02 | Stop reason: HOSPADM

## 2021-12-02 RX ORDER — ATENOLOL 25 MG/1
50 TABLET ORAL DAILY
Status: DISCONTINUED | OUTPATIENT
Start: 2021-12-02 | End: 2021-12-02 | Stop reason: HOSPADM

## 2021-12-02 RX ORDER — ROSUVASTATIN CALCIUM 20 MG/1
20 TABLET, COATED ORAL NIGHTLY
Status: DISCONTINUED | OUTPATIENT
Start: 2021-12-02 | End: 2021-12-02 | Stop reason: HOSPADM

## 2021-12-02 RX ORDER — MIDODRINE HYDROCHLORIDE 10 MG/1
10 TABLET ORAL
Qty: 90 TABLET | Refills: 3 | Status: ON HOLD | OUTPATIENT
Start: 2021-12-02 | End: 2022-03-16

## 2021-12-02 RX ORDER — ATENOLOL 100 MG/1
100 TABLET ORAL DAILY
Qty: 30 TABLET | Refills: 1 | Status: ON HOLD | OUTPATIENT
Start: 2021-12-02 | End: 2022-02-17 | Stop reason: HOSPADM

## 2021-12-02 RX ADMIN — MIDODRINE HYDROCHLORIDE 10 MG: 5 TABLET ORAL at 11:55

## 2021-12-02 RX ADMIN — SODIUM CHLORIDE, PRESERVATIVE FREE 10 ML: 5 INJECTION INTRAVENOUS at 07:39

## 2021-12-02 RX ADMIN — MIDODRINE HYDROCHLORIDE 10 MG: 5 TABLET ORAL at 07:39

## 2021-12-02 ASSESSMENT — PAIN SCALES - GENERAL
PAINLEVEL_OUTOF10: 0
PAINLEVEL_OUTOF10: 0

## 2021-12-02 NOTE — DISCHARGE SUMMARY
Discharge Summary    Bridgette Yu  :  1940  MRN:  [id number]    Admit date:  2021  Discharge date:       Admitting Physician:  Grecia Queen MD    Discharge Diagnoses:    Patient Active Problem List   Diagnosis    Ischemic chest pain (HCC)    Bradycardia    Shortness of breath    Acute asthmatic bronchitis    Left inguinal hernia    Mild intermittent asthma    Status post hemorrhoidectomy    Hypotension       Admission Condition:  fair    Discharged Condition:  fair    Hospital Course:   Was brought in for cardiac catherization d/t Severe AS, workup for TAVR. Found to have moderate-severe AS mean gradient of 35mmHg. Normal right sided Heart pressure  LHC was performed, No coronary intervention was done, continue to treat medically and optimize medial therapy. Post cath Hematoma developed on right groin site. Pressure was held and hematoma cleared. US was completed, no pseudoaneurysm found. Today bruising noted on groin site, however site is soft. Pain is very tolerable per pt. He has been able to ambulate up to the bathroom with no complication. Will have him closely f/u in office. Will hold asa, eliquis d/t bleeding and hold chlorthalidone d/t hypotension. Does of midodrine prescribe to help with hypotension. Continue home BB. Will be discharge home today with wife. Will f/u in the office in 1 week. Addressing Aspirin:  Contraindicated due to hematoma    Discharge Medications:       Medication List        START taking these medications      midodrine 10 MG tablet  Commonly known as: PROAMATINE  Take 1 tablet by mouth 3 times daily (with meals)            CHANGE how you take these medications      acetaminophen 500 MG tablet  Commonly known as: TYLENOL  What changed: Another medication with the same name was removed. Continue taking this medication, and follow the directions you see here.             CONTINUE taking these medications      * albuterol sulfate  (90 Base) MCG/ACT inhaler  Commonly known as: ProAir HFA  Inhale 2 puffs into the lungs every 6 hours as needed for Wheezing     * albuterol sulfate  (90 Base) MCG/ACT inhaler  Commonly known as: ProAir HFA  Inhale 2 puffs into the lungs every 6 hours as needed for Wheezing     * albuterol sulfate  (90 Base) MCG/ACT inhaler  Commonly known as: ProAir HFA  Inhale 2 puffs into the lungs every 6 hours as needed for Wheezing     cetirizine 10 MG tablet  Commonly known as: ZYRTEC     cimetidine 200 MG tablet  Commonly known as: TAGAMET     clopidogrel 75 MG tablet  Commonly known as: PLAVIX     Crestor 10 MG tablet  Generic drug: rosuvastatin     finasteride 5 MG tablet  Commonly known as: PROSCAR     levothyroxine 100 MCG tablet  Commonly known as: SYNTHROID     montelukast 10 MG tablet  Commonly known as: SINGULAIR     Nasonex 50 MCG/ACT nasal spray  Generic drug: mometasone     nystatin 302468 UNIT/GM cream  Commonly known as: MYCOSTATIN     potassium citrate 10 MEQ (1080 MG) extended release tablet  Commonly known as: UROCIT-K     raNITIdine 150 MG tablet  Commonly known as: ZANTAC  Take 2 tablets by mouth nightly     traMADol 50 MG tablet  Commonly known as: ULTRAM     Viagra 100 MG tablet  Generic drug: sildenafil     vitamin D 1000 UNIT Tabs tablet  Commonly known as: CHOLECALCIFEROL           * This list has 3 medication(s) that are the same as other medications prescribed for you. Read the directions carefully, and ask your doctor or other care provider to review them with you. STOP taking these medications      chlorthalidone 25 MG tablet  Commonly known as: HYGROTON     Eliquis 5 MG Tabs tablet  Generic drug: apixaban     Klor-Con 10 10 MEQ extended release tablet  Generic drug: potassium chloride            ASK your doctor about these medications      * atenolol 100 MG tablet  Commonly known as: TENORMIN  Ask about: Which instructions should I use?      * atenolol 50 MG tablet  Commonly known as: TENORMIN  Take 1 tablet by mouth daily  Ask about: Which instructions should I use? * This list has 2 medication(s) that are the same as other medications prescribed for you. Read the directions carefully, and ask your doctor or other care provider to review them with you.                    Where to Get Your Medications        These medications were sent to [facility], [address] [facility] [phone] - F [phone]  [address] [place] [address]      Phone: [phone]   atenolol 50 MG tablet  midodrine 10 MG tablet         Consults:  none      Disposition:   home    Signed:  Electronically signed by DIAMOND Muñiz CNP on 12/2/2021 at 11:03 AM

## 2021-12-02 NOTE — CARE COORDINATION
Intern CM met with pt and wife, Tico Marinelli, (wife at bedside) for d/c planning. Introduced self and explained CM role. Pt lives with spouse in a one story home with one step into the home. Pt has the following HME:  cane, w/c, shower chair, walker, transport chair, grab bars in shower and around toilet. Pt is an [employer] . Pt has PCP. Pt expressed that he expects to be having a cardiac procedure in the near future and would be interested in [facility]. at that time. Pt's HC preference is [facility]. Pt expresses no other needs or concerns for discharge at this time.

## 2021-12-02 NOTE — PROGRESS NOTES
Doing well  Stable over night  Off all pressors and IVF  Right groin has hematoma and bruising    US of leg negative for PSA  Labs are stable  D/c home today  F/u on Monday in office with med bottles  Hold AC until seen on Monday  Resume his Atenolol 100mg he is at home  Add proamatine for now  Will check dobutamine echo in office next week prior to TAVR    Electronically signed by Shai Rouse MD on 12/2/21 at 11:32 AM EST      US of arm  Impression:        1.  No evidence of pseudoaneurysm.      2.  A 6.3 cm collection within the right groin is most consistent with a   hematoma.

## 2021-12-02 NOTE — PROCEDURES
[address] [place] [address]    PATIENT NAME: Dionte Holloway                  :        1940  MED REC NO:   [id number]                          ROOM:       2105  ACCOUNT NO:   [de-identified]                           ADMIT DATE: 2021  PROVIDER:     Ashlie Mantilla MD    DATE OF STUDY:  2021    NANETTE was performed. This is an 57-year-old male patient. The patient's NANETTE was performed  _____. In the cath lab holding, the patient received 6 mg Versed and  62.5 mg of fentanyl. The posterior pharynx was anesthetized using  lidocaine viscous gel. A mouthguard was placed. NANETTE probe was advanced  to the posterior oropharynx and midesophagus. Images were obtained. Biatrial is moderately enlarged. Fundus has echodensity noted in left  atrium. There is no clot or thrombus noted in the left atrium or left  atrial appendage. Mitral valve leaflets are thickened. There is  moderate-to-severe mitral regurgitation noted _____. LV function is  preserved at 40-50%. There is a pacer wire in the right side of heart  and aortic valve is sclerotic with severe aortic stenosis present. Mild  aortic regurgitation present. Ascending aorta is slightly dilated. IMPRESSION:  1. Spontaneous echodensity noted in the left atrium. 2.  Biatrial moderately enlarged. 3.  Moderate-to-severe mitral regurgitation noted --posterior  directed jet. 4.  LV function was preserved at 40-50%. 5.  Pacer wire is noted in the right side of the heart. 6.  Aortic stenosis noted and mild aortic regurgitation noted. 7.  Ascending aorta is mildly dilated.         Isabella Cisneros MD    D: 2021 12:20:31       T: 2021 12:22:59     [email or web address]  Job#: [id number]     Doc#: [id number]    CC:

## 2021-12-28 ENCOUNTER — HOSPITAL ENCOUNTER (EMERGENCY)
Age: 81
Discharge: LEFT BEFORE DISCHARGE | End: 2021-12-28

## 2021-12-28 VITALS
HEART RATE: 87 BPM | DIASTOLIC BLOOD PRESSURE: 73 MMHG | TEMPERATURE: 98.2 F | OXYGEN SATURATION: 98 % | SYSTOLIC BLOOD PRESSURE: 126 MMHG | RESPIRATION RATE: 15 BRPM

## 2022-01-27 ENCOUNTER — HOSPITAL ENCOUNTER (OUTPATIENT)
Dept: PULMONOLOGY | Age: 82
Discharge: HOME | End: 2022-01-27
Payer: MEDICARE

## 2022-01-27 ENCOUNTER — HOSPITAL ENCOUNTER (OUTPATIENT)
Dept: CT IMAGING | Age: 82
Discharge: HOME | End: 2022-01-27
Payer: MEDICARE

## 2022-01-27 ENCOUNTER — HOSPITAL ENCOUNTER (OUTPATIENT)
Age: 82
Discharge: HOME | End: 2022-01-27
Payer: MEDICARE

## 2022-01-27 ENCOUNTER — HOSPITAL ENCOUNTER (OUTPATIENT)
Dept: CARDIAC CATH/INVASIVE PROCEDURES | Age: 82
Discharge: HOME | End: 2022-01-27
Attending: SURGERY | Admitting: SURGERY
Payer: MEDICARE

## 2022-01-27 ENCOUNTER — HOSPITAL ENCOUNTER (OUTPATIENT)
Dept: ULTRASOUND IMAGING | Age: 82
Discharge: HOME | End: 2022-01-27
Payer: MEDICARE

## 2022-01-27 VITALS
HEIGHT: 71 IN | BODY MASS INDEX: 20.02 KG/M2 | SYSTOLIC BLOOD PRESSURE: 140 MMHG | OXYGEN SATURATION: 98 % | WEIGHT: 143 LBS | DIASTOLIC BLOOD PRESSURE: 75 MMHG | HEART RATE: 62 BPM | RESPIRATION RATE: 15 BRPM

## 2022-01-27 LAB
ALBUMIN SERPL-MCNC: 4.3 GM/DL (ref 3.4–5)
ALP BLD-CCNC: 65 IU/L (ref 40–129)
ALT SERPL-CCNC: 16 U/L (ref 10–40)
ANION GAP SERPL CALCULATED.3IONS-SCNC: 13 MMOL/L (ref 4–16)
APTT: 45.4 SECONDS (ref 25.1–37.1)
AST SERPL-CCNC: 32 IU/L (ref 15–37)
BILIRUB SERPL-MCNC: 0.7 MG/DL (ref 0–1)
BUN BLDV-MCNC: 32 MG/DL (ref 6–23)
CALCIUM SERPL-MCNC: 9.7 MG/DL (ref 8.3–10.6)
CHLORIDE BLD-SCNC: 98 MMOL/L (ref 99–110)
CO2: 24 MMOL/L (ref 21–32)
CREAT SERPL-MCNC: 1.3 MG/DL (ref 0.9–1.3)
DLCO %PRED: NORMAL %
DLCO PRED: NORMAL
DLCO/VA %PRED: NORMAL
DLCO/VA PRED: NORMAL
DLCO/VA: NORMAL
DLCO: NORMAL
EKG ATRIAL RATE: 63 BPM
EKG DIAGNOSIS: NORMAL
EKG P AXIS: 106 DEGREES
EKG Q-T INTERVAL: 512 MS
EKG QRS DURATION: 228 MS
EKG QTC CALCULATION (BAZETT): 515 MS
EKG R AXIS: -68 DEGREES
EKG T AXIS: 98 DEGREES
EKG VENTRICULAR RATE: 61 BPM
EXPIRATORY TIME: NORMAL
FEF 25-75% %PRED-PRE: NORMAL
FEF 25-75% PRED: NORMAL
FEF 25-75%-PRE: NORMAL
FEV1 %PRED-PRE: 86 %
FEV1 PRED: NORMAL
FEV1/FVC %PRED-PRE: NORMAL
FEV1/FVC PRED: NORMAL
FEV1/FVC: 73.2 %
FEV1: NORMAL
FVC %PRED-PRE: NORMAL
FVC PRED: NORMAL
FVC: NORMAL
GAW %PRED: NORMAL
GAW PRED: NORMAL
GAW: NORMAL
GFR AFRICAN AMERICAN: >60 ML/MIN/1.73M2
GFR NON-AFRICAN AMERICAN: 53 ML/MIN/1.73M2
GLUCOSE BLD-MCNC: 91 MG/DL (ref 70–99)
HCT VFR BLD CALC: 47.6 % (ref 42–52)
HEMOGLOBIN: 15.2 GM/DL (ref 13.5–18)
IC %PRED: NORMAL
IC PRED: NORMAL
IC: NORMAL
INR BLD: 1.31 INDEX
MCH RBC QN AUTO: 30.5 PG (ref 27–31)
MCHC RBC AUTO-ENTMCNC: 31.9 % (ref 32–36)
MCV RBC AUTO: 95.6 FL (ref 78–100)
MVV %PRED-PRE: NORMAL
MVV PRED: NORMAL
MVV-PRE: NORMAL
PDW BLD-RTO: 12.2 % (ref 11.7–14.9)
PEF %PRED-PRE: NORMAL
PEF PRED: NORMAL
PEF-PRE: NORMAL
PLATELET # BLD: 195 K/CU MM (ref 140–440)
PMV BLD AUTO: 10.1 FL (ref 7.5–11.1)
POTASSIUM SERPL-SCNC: 3.5 MMOL/L (ref 3.5–5.1)
PRO-BNP: 8543 PG/ML
PROTHROMBIN TIME: 17 SECONDS (ref 11.7–14.5)
RAW %PRED: NORMAL
RAW PRED: NORMAL
RAW: NORMAL
RBC # BLD: 4.98 M/CU MM (ref 4.6–6.2)
RV %PRED: NORMAL
RV PRED: NORMAL
RV: NORMAL
SARS-COV-2, NAAT: NOT DETECTED
SODIUM BLD-SCNC: 135 MMOL/L (ref 135–145)
SOURCE: NORMAL
SVC %PRED: NORMAL
SVC PRED: NORMAL
SVC: NORMAL
TLC %PRED: NORMAL %
TLC PRED: NORMAL
TLC: NORMAL
TOTAL PROTEIN: 7.6 GM/DL (ref 6.4–8.2)
VA %PRED: NORMAL
VA PRED: NORMAL
VA: NORMAL
VTG %PRED: NORMAL
VTG PRED: NORMAL
VTG: NORMAL
WBC # BLD: 6.6 K/CU MM (ref 4–10.5)

## 2022-01-27 PROCEDURE — 93010 ELECTROCARDIOGRAM REPORT: CPT | Performed by: INTERNAL MEDICINE

## 2022-01-27 PROCEDURE — 93880 EXTRACRANIAL BILAT STUDY: CPT

## 2022-01-27 PROCEDURE — 75574 CT ANGIO HRT W/3D IMAGE: CPT

## 2022-01-27 PROCEDURE — 94010 BREATHING CAPACITY TEST: CPT

## 2022-01-27 PROCEDURE — 80053 COMPREHEN METABOLIC PANEL: CPT

## 2022-01-27 PROCEDURE — 85610 PROTHROMBIN TIME: CPT

## 2022-01-27 PROCEDURE — 83880 ASSAY OF NATRIURETIC PEPTIDE: CPT

## 2022-01-27 PROCEDURE — 85730 THROMBOPLASTIN TIME PARTIAL: CPT

## 2022-01-27 PROCEDURE — 74174 CTA ABD&PLVS W/CONTRAST: CPT

## 2022-01-27 PROCEDURE — 87635 SARS-COV-2 COVID-19 AMP PRB: CPT

## 2022-01-27 PROCEDURE — 6360000004 HC RX CONTRAST MEDICATION: Performed by: SURGERY

## 2022-01-27 PROCEDURE — 99204 OFFICE O/P NEW MOD 45 MIN: CPT | Performed by: INTERNAL MEDICINE

## 2022-01-27 PROCEDURE — 85027 COMPLETE CBC AUTOMATED: CPT

## 2022-01-27 PROCEDURE — 2580000003 HC RX 258: Performed by: SURGERY

## 2022-01-27 PROCEDURE — 93005 ELECTROCARDIOGRAM TRACING: CPT | Performed by: SURGERY

## 2022-01-27 RX ORDER — SODIUM CHLORIDE 9 MG/ML
INJECTION, SOLUTION INTRAVENOUS CONTINUOUS
Status: DISCONTINUED | OUTPATIENT
Start: 2022-01-27 | End: 2022-01-27 | Stop reason: HOSPADM

## 2022-01-27 RX ORDER — CHLORTHALIDONE 25 MG/1
25 TABLET ORAL DAILY
Status: ON HOLD | COMMUNITY
End: 2022-02-17 | Stop reason: HOSPADM

## 2022-01-27 RX ADMIN — SODIUM CHLORIDE: 9 INJECTION, SOLUTION INTRAVENOUS at 07:37

## 2022-01-27 RX ADMIN — IOPAMIDOL 150 ML: 755 INJECTION, SOLUTION INTRAVENOUS at 10:12

## 2022-01-27 ASSESSMENT — PULMONARY FUNCTION TESTS
FEV1_PERCENT_PREDICTED_PRE: 86
FEV1/FVC: 73.2

## 2022-01-27 NOTE — CONSULTS
Name:  Luis Riddle /Age/Sex: 1940  (79 y.o. male)   MRN & CSN:  [id number] & [id number] Admission Date/Time: 2022  7:19 AM   Location:  Cath Lab/NONE PCP: Shane Sandifer, [address] Day: 1          Referring physician:  Iwona Ohara MD         Reason for consultation: Aortic stenosis        Thanks for referral.    Information source: patient    CC; shortness of breath      HPI:   Thank you for involving me in taking  care of Luis Riddle who  is a 79 y. o.year  Old male  Presents with recent diagnosis of severe aortic stenosis had a cardiac catheterization done which showed   As follows        IMPRESSION:  1. Left main is patent. 2.  LAD has mid 100% in-stent stenosis noted. ANDREZ-0 flow present. 3.  Circ is proximally 100% occluded, which is unchanged from before. The OM graft is filling from the competitive flow. 4.  Right coronary artery has a distal 50-70% stenosis present, which is  unchanged from before. 5.  SVG to PDA is patent. It also fills the PLB branch and the PLB  branch has 50-70% stenosis noted. 6.  SVG to OM is patent. 7.  SVG to diagonal is patent. LIMA is noted to be atretic. 8.  There is moderate-to-severe aortic stenosis noted. Mean gradient of  35 mmHg present. Echo showed     Summary   Bi-atrial enlargement. No evidence of thrombus within KALRY; spontaneous contrast was noted. PPM wiring visualized in right heart. Mild aortic regurgitation. Severe aortic stenosis with a mean gradient of 40 mmHg, MADAI: 0.39 cm sq, MADAI   VTI: 89.1 cm, DVI: 0.2, peak velocity: 375 cm/s. Moderate mitral regurgitation. No evidence of any pericardial effusion.    Attempts at PCI of the LAD were unsuccessful    Patient has been having increasing shortness of breath with exertion also feels palpitation and heart racing however has no chest pain now is here for evaluation for TAVR    Past medical history:    has a past medical history of A-fib [facility]), Acute asthmatic bronchitis, Acute MI ([facility] [address] CAD (coronary artery disease), Colon polyp, Diverticulosis, Esophagitis, Hemochromatosis, Hiatal hernia, Hx of blood clots, Hyperlipidemia, Hypertension, Internal hemorrhoids, Mild intermittent asthma, Mild persistent asthma without complication, Phlebitis, Shortness of breath, and SVT (supraventricular tachycardia) ([facility] [address] Past surgical history:   has a past surgical history that includes Coronary artery bypass graft; Pacemaker insertion; hernia repair; Toe Surgery; Tonsillectomy; Vasectomy; Total knee arthroplasty; Colonoscopy (7-28-14); and Upper gastrointestinal endoscopy (7-28-14). Social History:   reports that he has never smoked. He has never used smokeless tobacco. He reports that he does not drink alcohol and does not use drugs. Family history:  family history includes Heart Disease in his father; Other (age of onset: 27) in his sister. Allergies   Allergen Reactions    Lisinopril Anaphylaxis    Lopressor [Metoprolol] Swelling    Ciprofloxacin Nausea And Vomiting    Lorazepam Other (See Comments)     Hallucinations    Morphine Other (See Comments)     hallucinations    Pcn [Penicillins]     Vicodin [Hydrocodone-Acetaminophen]     Ambien [Zolpidem Tartrate] Nausea And Vomiting    Ativan [Lorazepam] Nausea And Vomiting    Avelox [Moxifloxacin] Nausea And Vomiting    Codeine Nausea And Vomiting       0.9 % sodium chloride infusion, Continuous      Current Facility-Administered Medications   Medication Dose Route Frequency Provider Last Rate Last Admin    0.9 % sodium chloride infusion   IntraVENous Continuous Amber Chi  mL/hr at 01/27/22 0737 New Bag at 01/27/22 5296     Review of Systems:  All 14 systems reviewed, all negative except for  sob    Physical Examination:    There were no vitals taken for this visit.      Wt Readings from Last 3 Encounters:   12/01/21 146 lb (66.2 kg)   08/13/21 147 lb (66.7 kg)   07/02/21 147 lb (66.7 kg)     There is no height or weight on file to calculate BMI. General Appearance:  fair  Head: normocephalic     Eyes: normal, noninjected conjunctiva    ENT: normal mucosa, noninjected throat, normal     NECK: No JVP  No thyromegaly        Cardiovascular: No thrills palpated   Auscultation: Normal S1 and S2,  3/6 se murmur   carotid bruit no   Abdominal Aorta no bruit    Respiratory:    Breath sounds Clear = 0    Extremities:  Trace Edema clubbing ,   no cyanosis    SKIN: Warm and well perfused, no pallor or cyanosis    Vascular exam:  Pedal Pulses: palp  bilaterally        Abdomen:  No masses or tenderness. No organomegaly noted. Neurological:  Oriented to time, place, and person   No focal neurological deficit noted. Psychiatric:normal mood, no anxiety    Lab Review   No results found for this or any previous visit (from the past 24 hour(s)). Assessment/Recommendations:     -As mentioned above the patient has severe aortic stenosis and CAD which is stable patient will be undergoing TAVR the TAVR was discussed with the patient patient is undergoing a CT scan today for further evaluation for sizing and for checking his vasculature           -     CORONARY ARTERY DISEASE:  mildly symptomatic     All available  tests in chart reviewed. Management discussed . Testing ordered  No  Patient is on aspirin and atenolol                               -  LIPID MANAGEMENT:  Importance of lipid levels discussed with patient   and patient was given dietary advice. NCEP- ATP III guidelines reviewed with patient. -   Changes  in medicines made: No     On crestor                    -Per records per patient also had episodes of SVT    -  Hypertension: Patients blood pressure is normal. Patient is advised about low sodium diet. Present medical regimen will not be changed. - PPM stable     - CKD getting hydrated for CTA                  .                   Darian Arreguin MD, 1/27/2022 7:45 AM

## 2022-01-27 NOTE — CONSULTS
Department of Cardiovascular & Thoracic Surgery   Valve Clinic Consult Note        Reason for Consult:  AS  Requesting Physician:  Dr. Marlen Vázquez    Date of Consult: 01/27/22      History Obtained From:  Pt, MD, EMR    HISTORY OF PRESENT ILLNESS:    The patient is a  79 y.o. male with AS. Patient admits to increasing tachycardia with minimal activity. When he has these episodes, he gets very dyspnea and has to completely rest.  It take a little while to revert to normal.  He can barely walk ten feet without getting tachycardic. C/O mild BLE edema. +Orthopnea. He is S/P CABG in 2006. Echo was significant for severe AS, mod MR, mild AI. Cardiologist requesting consult for evaluation of AS. Past Medical History:        Diagnosis Date    A-fib [facility])     Acute asthmatic bronchitis 1/4/2018    Acute MI ([facility] [address]     CAD (coronary artery disease)     Colon polyp     Diverticulosis     Esophagitis     Hemochromatosis     Hiatal hernia     Hx of blood clots     Hyperlipidemia     Hypertension     Internal hemorrhoids     Mild intermittent asthma 3/29/2021    Mild persistent asthma without complication 9/40/8629    Phlebitis     Shortness of breath 1/4/2018    SVT (supraventricular tachycardia) (HCC)      Past Surgical History:        Procedure Laterality Date    COLONOSCOPY  7-28-14    CORONARY ARTERY BYPASS GRAFT      HERNIA REPAIR      PACEMAKER INSERTION      TOE SURGERY      TONSILLECTOMY      TOTAL KNEE ARTHROPLASTY      UPPER GASTROINTESTINAL ENDOSCOPY  7-28-14    VASECTOMY       Current Medications:   No current facility-administered medications on file prior to encounter.      Current Outpatient Medications on File Prior to Encounter   Medication Sig Dispense Refill    apixaban (ELIQUIS) 5 MG TABS tablet Take 5 mg by mouth 2 times daily      chlorthalidone (HYGROTON) 25 MG tablet Take 25 mg by mouth daily      midodrine (PROAMATINE) 10 MG tablet Take 1 tablet by mouth 3 times daily Hallucinations    Morphine Other (See Comments)     hallucinations    Pcn [Penicillins]     Vicodin [Hydrocodone-Acetaminophen]     Ambien [Zolpidem Tartrate] Nausea And Vomiting    Ativan [Lorazepam] Nausea And Vomiting    Avelox [Moxifloxacin] Nausea And Vomiting    Codeine Nausea And Vomiting         Social History:   Social History     Socioeconomic History    Marital status:      Spouse name: Not on file    Number of children: 2    Years of education: Not on file    Highest education level: Not on file   Occupational History    Not on file   Tobacco Use    Smoking status: Never Smoker    Smokeless tobacco: Never Used   Vaping Use    Vaping Use: Never used   Substance and Sexual Activity    Alcohol use: No    Drug use: No    Sexual activity: Not on file   Other Topics Concern    Not on file   Social History Narrative    Not on file     Social Determinants of Health     Financial Resource Strain:     Difficulty of Paying Living Expenses: Not on file   Food Insecurity:     Worried About Running Out of Food in the Last Year: Not on file    Jun of Food in the Last Year: Not on file   Transportation Needs:     Lack of Transportation (Medical): Not on file    Lack of Transportation (Non-Medical):  Not on file   Physical Activity:     Days of Exercise per Week: Not on file    Minutes of Exercise per Session: Not on file   Stress:     Feeling of Stress : Not on file   Social Connections:     Frequency of Communication with Friends and Family: Not on file    Frequency of Social Gatherings with Friends and Family: Not on file    Attends Sikh Services: Not on file    Active Member of Clubs or Organizations: Not on file    Attends Club or Organization Meetings: Not on file    Marital Status: Not on file   Intimate Partner Violence:     Fear of Current or Ex-Partner: Not on file    Emotionally Abused: Not on file    Physically Abused: Not on file    Sexually Abused: Not on file   Housing Stability:     Unable to Pay for Housing in the Last Year: Not on file    Number of Places Lived in the Last Year: Not on file    Unstable Housing in the Last Year: Not on file       Family History:  History reviewed. No pertinent family history. REVIEW OF SYSTEMS:   Complete ROS performed and negative except as noted in HPI. PHYSICAL EXAM:  Physical Exam  VITALS:  BP (!) 140/75   Pulse 62   Resp 15   Ht 5' 11\" (1.803 m)   Wt 143 lb (64.9 kg)   SpO2 98%   BMI 19.94 kg/m²   24HR INTAKE/OUTPUT:  No intake or output data in the 24 hours ending 01/27/22 1212    General appearance: No apparent distress, appears stated age and cooperative. Skin: unremarkable, warm and dry  HEENT Normocephalic, atraumatic without obvious deformity. Conjunctiva normal, EOMI, hearing intact  Neck: Supple, Trachea midline   Lungs: Good respiratory effort. Clear to auscultation, bilaterally  Heart: Regular rate/ rhythm, +RADHA   Abdomen: Soft, non-tender or non-distended   Extremities: mild edema warm well perfused  Neurologic: Alert, grossly intact, no sensory deficit  Psych/Mental status: normal affect        DATA:  Images Reviewed  VL DUP CAROTID BILATERAL    Result Date: 1/27/2022  EXAMINATION: ULTRASOUND EVALUATION OF THE CAROTID ARTERIES 1/27/2022 10:04 am TECHNIQUE: Duplex ultrasound using B-mode/gray scaled imaging, Doppler spectral analysis and color flow Doppler was obtained of the carotid arteries. COMPARISON: Bilateral carotid artery duplex sonogram 03/27/2009 HISTORY: ORDERING SYSTEM PROVIDED HISTORY: Pre TAVR; i35.0 TECHNOLOGIST PROVIDED HISTORY: Reason for exam:->Pre TAVR; i35.0 FINDINGS: Patent common, internal, and external carotid arteries and vertebral arteries with antegrade flow. Minimal mixed plaque in the bilateral carotid bulbs partially extending into the origins of the respective external carotid arteries.   Peak systolic velocities as below: RIGHT Common carotid (proximal):  61 cm/s

## 2022-02-09 ENCOUNTER — HOSPITAL ENCOUNTER (OUTPATIENT)
Dept: NON INVASIVE DIAGNOSTICS | Age: 82
Discharge: HOME | End: 2022-02-09
Payer: MEDICARE

## 2022-02-09 LAB
LV EF: 48 %
LVEF MODALITY: NORMAL

## 2022-02-09 PROCEDURE — 93306 TTE W/DOPPLER COMPLETE: CPT

## 2022-02-10 ENCOUNTER — HOSPITAL ENCOUNTER (OUTPATIENT)
Dept: CARDIAC CATH/INVASIVE PROCEDURES | Age: 82
Discharge: HOME | End: 2022-02-10
Attending: SURGERY | Admitting: SURGERY
Payer: MEDICARE

## 2022-02-10 ENCOUNTER — HOSPITAL ENCOUNTER (OUTPATIENT)
Age: 82
Discharge: HOME | End: 2022-02-10
Payer: MEDICARE

## 2022-02-10 ENCOUNTER — APPOINTMENT (OUTPATIENT)
Dept: GENERAL RADIOLOGY | Age: 82
End: 2022-02-10
Attending: SURGERY
Payer: MEDICARE

## 2022-02-10 LAB
ALBUMIN SERPL-MCNC: 4.2 GM/DL (ref 3.4–5)
ALP BLD-CCNC: 63 IU/L (ref 40–129)
ALT SERPL-CCNC: 14 U/L (ref 10–40)
ANION GAP SERPL CALCULATED.3IONS-SCNC: 13 MMOL/L (ref 4–16)
APTT: 46.8 SECONDS (ref 25.1–37.1)
AST SERPL-CCNC: 26 IU/L (ref 15–37)
BILIRUB SERPL-MCNC: 0.7 MG/DL (ref 0–1)
BUN BLDV-MCNC: 30 MG/DL (ref 6–23)
CALCIUM SERPL-MCNC: 9.5 MG/DL (ref 8.3–10.6)
CHLORIDE BLD-SCNC: 98 MMOL/L (ref 99–110)
CO2: 24 MMOL/L (ref 21–32)
CREAT SERPL-MCNC: 1.4 MG/DL (ref 0.9–1.3)
GFR AFRICAN AMERICAN: 59 ML/MIN/1.73M2
GFR NON-AFRICAN AMERICAN: 49 ML/MIN/1.73M2
GLUCOSE BLD-MCNC: 89 MG/DL (ref 70–99)
HCT VFR BLD CALC: 45.7 % (ref 42–52)
HEMOGLOBIN: 15.3 GM/DL (ref 13.5–18)
INR BLD: 1.42 INDEX
MCH RBC QN AUTO: 30.8 PG (ref 27–31)
MCHC RBC AUTO-ENTMCNC: 33.5 % (ref 32–36)
MCV RBC AUTO: 92.1 FL (ref 78–100)
PDW BLD-RTO: 12.1 % (ref 11.7–14.9)
PLATELET # BLD: 195 K/CU MM (ref 140–440)
PMV BLD AUTO: 9.8 FL (ref 7.5–11.1)
POTASSIUM SERPL-SCNC: 3.6 MMOL/L (ref 3.5–5.1)
PRO-BNP: ABNORMAL PG/ML
PROTHROMBIN TIME: 18.4 SECONDS (ref 11.7–14.5)
RBC # BLD: 4.96 M/CU MM (ref 4.6–6.2)
SARS-COV-2: NOT DETECTED
SODIUM BLD-SCNC: 135 MMOL/L (ref 135–145)
SOURCE: NORMAL
TOTAL PROTEIN: 7.1 GM/DL (ref 6.4–8.2)
WBC # BLD: 6.9 K/CU MM (ref 4–10.5)

## 2022-02-10 PROCEDURE — 85730 THROMBOPLASTIN TIME PARTIAL: CPT

## 2022-02-10 PROCEDURE — U0005 INFEC AGEN DETEC AMPLI PROBE: HCPCS

## 2022-02-10 PROCEDURE — 83880 ASSAY OF NATRIURETIC PEPTIDE: CPT

## 2022-02-10 PROCEDURE — 86900 BLOOD TYPING SEROLOGIC ABO: CPT

## 2022-02-10 PROCEDURE — 85610 PROTHROMBIN TIME: CPT

## 2022-02-10 PROCEDURE — 86901 BLOOD TYPING SEROLOGIC RH(D): CPT

## 2022-02-10 PROCEDURE — 80053 COMPREHEN METABOLIC PANEL: CPT

## 2022-02-10 PROCEDURE — 93005 ELECTROCARDIOGRAM TRACING: CPT | Performed by: SURGERY

## 2022-02-10 PROCEDURE — 71046 X-RAY EXAM CHEST 2 VIEWS: CPT

## 2022-02-10 PROCEDURE — 86850 RBC ANTIBODY SCREEN: CPT

## 2022-02-10 PROCEDURE — 85027 COMPLETE CBC AUTOMATED: CPT

## 2022-02-10 PROCEDURE — U0003 INFECTIOUS AGENT DETECTION BY NUCLEIC ACID (DNA OR RNA); SEVERE ACUTE RESPIRATORY SYNDROME CORONAVIRUS 2 (SARS-COV-2) (CORONAVIRUS DISEASE [COVID-19]), AMPLIFIED PROBE TECHNIQUE, MAKING USE OF HIGH THROUGHPUT TECHNOLOGIES AS DESCRIBED BY CMS-2020-01-R: HCPCS

## 2022-02-10 PROCEDURE — 86922 COMPATIBILITY TEST ANTIGLOB: CPT

## 2022-02-10 PROCEDURE — 36415 COLL VENOUS BLD VENIPUNCTURE: CPT

## 2022-02-11 LAB
EKG ATRIAL RATE: 60 BPM
EKG DIAGNOSIS: NORMAL
EKG Q-T INTERVAL: 538 MS
EKG QRS DURATION: 234 MS
EKG QTC CALCULATION (BAZETT): 541 MS
EKG R AXIS: -61 DEGREES
EKG T AXIS: 101 DEGREES
EKG VENTRICULAR RATE: 61 BPM

## 2022-02-11 PROCEDURE — 93010 ELECTROCARDIOGRAM REPORT: CPT | Performed by: INTERNAL MEDICINE

## 2022-02-15 ENCOUNTER — ANESTHESIA EVENT (OUTPATIENT)
Dept: CARDIAC CATH/INVASIVE PROCEDURES | Age: 82
DRG: 266 | End: 2022-02-15
Payer: MEDICARE

## 2022-02-15 RX ORDER — NOREPINEPHRINE BIT/0.9 % NACL 16MG/250ML
2-100 INFUSION BOTTLE (ML) INTRAVENOUS
Status: DISCONTINUED | OUTPATIENT
Start: 2022-02-16 | End: 2022-02-16

## 2022-02-15 NOTE — ANESTHESIA PRE PROCEDURE
Department of Anesthesiology  Preprocedure Note       Name:  Luis Riddle   Age:  79 y.o.  :  1940                                          MRN:  [id number]         Date:  2/15/2022      Surgeon: * Surgery not found *    Procedure:     Medications prior to admission:   Prior to Admission medications    Medication Sig Start Date End Date Taking?  Authorizing Provider   apixaban (ELIQUIS) 5 MG TABS tablet Take 5 mg by mouth 2 times daily    Historical Provider, MD   chlorthalidone (HYGROTON) 25 MG tablet Take 25 mg by mouth daily    Historical Provider, MD   midodrine (PROAMATINE) 10 MG tablet Take 1 tablet by mouth 3 times daily (with meals) 21   Rich Davidson, APRN - CNP   atenolol (TENORMIN) 100 MG tablet Take 1 tablet by mouth daily 21  Sandra Espinoza MD   cimetidine (TAGAMET) 200 MG tablet TAKE ONE TABLET BY MOUTH TWICE DAILY 7/3/21   Historical Provider, MD   traMADol (ULTRAM) 50 MG tablet TAKE ONE TABLET BY MOUTH EVERY SIX Hours AS NEEDED FOR pain 21   Historical Provider, MD   sildenafil (VIAGRA) 100 MG tablet Take 100 mg by mouth 20  Historical Provider, MD   albuterol sulfate HFA (PROAIR HFA) 108 (90 Base) MCG/ACT inhaler Inhale 2 puffs into the lungs every 6 hours as needed for Wheezing 21   Dayan Bergman MD   albuterol sulfate HFA (PROAIR HFA) 108 (90 Base) MCG/ACT inhaler Inhale 2 puffs into the lungs every 6 hours as needed for Wheezing 20   Dayan Bergman MD   albuterol sulfate HFA (PROAIR HFA) 108 (90 Base) MCG/ACT inhaler Inhale 2 puffs into the lungs every 6 hours as needed for Wheezing 20   Dayan Bergman MD   nystatin (MYCOSTATIN) 361192 UNIT/GM cream apply topically TO affected AREA TWICE DAILY 3/27/18   Historical Provider, MD   vitamin D (CHOLECALCIFEROL) 1000 UNIT TABS tablet Take 1,000 Units by mouth daily    Historical Provider, MD   ranitidine (ZANTAC) 150 MG tablet Take 2 tablets by mouth nightly 17   Kenisha Meals DO Abhijit   rosuvastatin (CRESTOR) 10 MG tablet Take 10 mg by mouth daily    Historical Provider, MD   acetaminophen (TYLENOL) 500 MG tablet Take 500 mg by mouth every 6 hours as needed for Pain    Historical Provider, MD   clopidogrel (PLAVIX) 75 MG tablet Take 75 mg by mouth daily. Historical Provider, MD   potassium citrate (UROCIT-K) 10 MEQ (1080 MG) SR tablet Take  by mouth 3 times daily (with meals). Historical Provider, MD   finasteride (PROSCAR) 5 MG tablet Take 5 mg by mouth daily. Historical Provider, MD   mometasone (NASONEX) 50 MCG/ACT nasal spray 2 sprays by Nasal route daily. Historical Provider, MD   montelukast (SINGULAIR) 10 MG tablet Take 10 mg by mouth nightly. Historical Provider, MD   levothyroxine (SYNTHROID) 100 MCG tablet Take 100 mcg by mouth Daily. Historical Provider, MD   cetirizine (ZYRTEC) 10 MG tablet Take 10 mg by mouth daily.     Historical Provider, MD       Current medications:    Current Outpatient Medications   Medication Sig Dispense Refill    apixaban (ELIQUIS) 5 MG TABS tablet Take 5 mg by mouth 2 times daily      chlorthalidone (HYGROTON) 25 MG tablet Take 25 mg by mouth daily      midodrine (PROAMATINE) 10 MG tablet Take 1 tablet by mouth 3 times daily (with meals) 90 tablet 3    atenolol (TENORMIN) 100 MG tablet Take 1 tablet by mouth daily 30 tablet 1    cimetidine (TAGAMET) 200 MG tablet TAKE ONE TABLET BY MOUTH TWICE DAILY      traMADol (ULTRAM) 50 MG tablet TAKE ONE TABLET BY MOUTH EVERY SIX Hours AS NEEDED FOR pain      sildenafil (VIAGRA) 100 MG tablet Take 100 mg by mouth      albuterol sulfate HFA (PROAIR HFA) 108 (90 Base) MCG/ACT inhaler Inhale 2 puffs into the lungs every 6 hours as needed for Wheezing 3 Inhaler 3    albuterol sulfate HFA (PROAIR HFA) 108 (90 Base) MCG/ACT inhaler Inhale 2 puffs into the lungs every 6 hours as needed for Wheezing 3 Inhaler 3    albuterol sulfate HFA (PROAIR HFA) 108 (90 Base) MCG/ACT inhaler Inhale 2 puffs into the lungs every 6 hours as needed for Wheezing 3 Inhaler 3    nystatin (MYCOSTATIN) 155523 UNIT/GM cream apply topically TO affected AREA TWICE DAILY  0    vitamin D (CHOLECALCIFEROL) 1000 UNIT TABS tablet Take 1,000 Units by mouth daily      ranitidine (ZANTAC) 150 MG tablet Take 2 tablets by mouth nightly 180 tablet 3    rosuvastatin (CRESTOR) 10 MG tablet Take 10 mg by mouth daily      acetaminophen (TYLENOL) 500 MG tablet Take 500 mg by mouth every 6 hours as needed for Pain      clopidogrel (PLAVIX) 75 MG tablet Take 75 mg by mouth daily.  potassium citrate (UROCIT-K) 10 MEQ (1080 MG) SR tablet Take  by mouth 3 times daily (with meals).  finasteride (PROSCAR) 5 MG tablet Take 5 mg by mouth daily.  mometasone (NASONEX) 50 MCG/ACT nasal spray 2 sprays by Nasal route daily.  montelukast (SINGULAIR) 10 MG tablet Take 10 mg by mouth nightly.  levothyroxine (SYNTHROID) 100 MCG tablet Take 100 mcg by mouth Daily.  cetirizine (ZYRTEC) 10 MG tablet Take 10 mg by mouth daily. No current facility-administered medications for this encounter. Allergies:     Allergies   Allergen Reactions    Lisinopril Anaphylaxis    Lopressor [Metoprolol] Swelling    Ciprofloxacin Nausea And Vomiting    Lorazepam Other (See Comments)     Hallucinations    Morphine Other (See Comments)     hallucinations    Pcn [Penicillins]     Vicodin [Hydrocodone-Acetaminophen]     Ambien [Zolpidem Tartrate] Nausea And Vomiting    Ativan [Lorazepam] Nausea And Vomiting    Avelox [Moxifloxacin] Nausea And Vomiting    Codeine Nausea And Vomiting       Problem List:    Patient Active Problem List   Diagnosis Code    Ischemic chest pain (HCC) I20.9    Bradycardia R00.1    Shortness of breath R06.02    Acute asthmatic bronchitis J45.909    Left inguinal hernia K40.90    Mild intermittent asthma J45.20    Status post hemorrhoidectomy Z98.890, Z87.19    Hypotension I95.9       Past Medical History:        Diagnosis Date    A-fib [facility])     Acute asthmatic bronchitis 1/4/2018    Acute MI ([address]     CAD (coronary artery disease)     Colon polyp     Diverticulosis     Esophagitis     Hemochromatosis     Hiatal hernia     Hx of blood clots     Hyperlipidemia     Hypertension     Internal hemorrhoids     Mild intermittent asthma 3/29/2021    Mild persistent asthma without complication 5/02/3321    Phlebitis     Shortness of breath 1/4/2018    SVT (supraventricular tachycardia) (HCC)        Past Surgical History:        Procedure Laterality Date    COLONOSCOPY  7-28-14    CORONARY ARTERY BYPASS GRAFT      HERNIA REPAIR      PACEMAKER INSERTION      TOE SURGERY      TONSILLECTOMY      TOTAL KNEE ARTHROPLASTY      UPPER GASTROINTESTINAL ENDOSCOPY  7-28-14    VASECTOMY         Social History:    Social History     Tobacco Use    Smoking status: Never Smoker    Smokeless tobacco: Never Used   Substance Use Topics    Alcohol use: No                                Counseling given: Not Answered      Vital Signs (Current): There were no vitals filed for this visit.                                            BP Readings from Last 3 Encounters:   01/27/22 (!) 140/75   12/28/21 126/73   12/02/21 (!) 105/56       NPO Status:                                                                                 BMI:   Wt Readings from Last 3 Encounters:   01/27/22 143 lb (64.9 kg)   12/01/21 146 lb (66.2 kg)   08/13/21 147 lb (66.7 kg)     There is no height or weight on file to calculate BMI.    CBC:   Lab Results   Component Value Date    WBC 6.9 02/10/2022    RBC 4.96 02/10/2022    HGB 15.3 02/10/2022    HCT 45.7 02/10/2022    MCV 92.1 02/10/2022    RDW 12.1 02/10/2022     02/10/2022       CMP:   Lab Results   Component Value Date     02/10/2022    K 3.6 02/10/2022    CL 98 02/10/2022    CO2 24 02/10/2022    BUN 30 02/10/2022    CREATININE 1.4 02/10/2022    GFRAA 59 02/10/2022    LABGLOM 49 02/10/2022    GLUCOSE 89 02/10/2022    PROT 7.1 02/10/2022    PROT 7.6 2011    CALCIUM 9.5 02/10/2022    BILITOT 0.7 02/10/2022    ALKPHOS 63 02/10/2022    AST 26 02/10/2022    ALT 14 02/10/2022       POC Tests: No results for input(s): POCGLU, POCNA, POCK, POCCL, POCBUN, POCHEMO, POCHCT in the last 72 hours. Coags:   Lab Results   Component Value Date    PROTIME 18.4 02/10/2022    PROTIME 11.0 2011    INR 1.42 02/10/2022    APTT 46.8 02/10/2022       HCG (If Applicable): No results found for: PREGTESTUR, PREGSERUM, HCG, HCGQUANT     ABGs: No results found for: PHART, PO2ART, RHY4AVZ, ARM6ANB, BEART, U5LUMTIW     Type & Screen (If Applicable):  No results found for: LABABO, LABRH    Drug/Infectious Status (If Applicable):  No results found for: HIV, HEPCAB    COVID-19 Screening (If Applicable):   Lab Results   Component Value Date    COVID19 NOT DETECTED 02/10/2022           Anesthesia Evaluation  Patient summary reviewed no history of anesthetic complications:   Airway: Mallampati: II  TM distance: >3 FB   Neck ROM: full  Mouth opening: > = 3 FB Dental: normal exam   (+) lower dentures      Pulmonary:   (+) decreased breath sounds,  asthma:                            Cardiovascular:  Exercise tolerance: poor (<4 METS),   (+) hypertension:, valvular problems/murmurs: AS, pacemaker: pacemaker, past MI: > 6 months and no interval change, CAD: no interval change, dysrhythmias: atrial fibrillation,          Beta Blocker:  Dose within 24 Hrs      ROS comment: Summary   Left ventricular systolic function is low normal.   Ejection fraction is visually estimated at 45-50%. Moderately dilated atrium bilaterally. Mild aortic regurgitation ( ms). Severe aortic stenosis (DVI 0.2, MADAI 0.82 cm sq, mean P mmHg). Moderate to severe mitral regurgitation (ERO 0.72 cm sq). Moderate tricuspid regurgitation; RVSP: 55 mmHg. Moderate pulmonic regurgitation.    No evidence of any pericardial effusion. Neuro/Psych:                ROS comment: [place]; has hearing aids GI/Hepatic/Renal:   (+) hiatal hernia,           Endo/Other:    (+) blood dyscrasia: anticoagulation therapy:., .                 Abdominal:             Vascular: negative vascular ROS. Other Findings:           Anesthesia Plan      MAC     ASA 4       Induction: intravenous. arterial line and NANETTE  MIPS: Postoperative opioids intended and Prophylactic antiemetics administered. Anesthetic plan and risks discussed with patient. Use of blood products discussed with patient whom consented to blood products. Plan discussed with CRNA. Pre Anesthesia Evaluation complete. Anesthesia plan, risks, benefits, alternatives, and personal involved discussed with patient. Patients and/or legal guardian verbalized an understanding  and agreed to proceed.   Janny Peralta DO  2/16/2022

## 2022-02-16 ENCOUNTER — ANESTHESIA (OUTPATIENT)
Dept: CARDIAC CATH/INVASIVE PROCEDURES | Age: 82
DRG: 266 | End: 2022-02-16
Payer: MEDICARE

## 2022-02-16 ENCOUNTER — HOSPITAL ENCOUNTER (INPATIENT)
Dept: CARDIAC CATH/INVASIVE PROCEDURES | Age: 82
LOS: 1 days | Discharge: HOME | DRG: 266 | End: 2022-02-17
Attending: SURGERY | Admitting: SURGERY
Payer: MEDICARE

## 2022-02-16 VITALS — OXYGEN SATURATION: 96 % | DIASTOLIC BLOOD PRESSURE: 93 MMHG | SYSTOLIC BLOOD PRESSURE: 146 MMHG

## 2022-02-16 LAB
ACTIVATED CLOTTING TIME, LOW RANGE: 155 SEC
ACTIVATED CLOTTING TIME, LOW RANGE: 165 SEC
ACTIVATED CLOTTING TIME, LOW RANGE: 339 SEC
ACTIVATED CLOTTING TIME, LOW RANGE: 354 SEC
ACTIVATED CLOTTING TIME, LOW RANGE: <0 SEC

## 2022-02-16 PROCEDURE — C1894 INTRO/SHEATH, NON-LASER: HCPCS

## 2022-02-16 PROCEDURE — 6360000002 HC RX W HCPCS

## 2022-02-16 PROCEDURE — 6370000000 HC RX 637 (ALT 250 FOR IP): Performed by: SURGERY

## 2022-02-16 PROCEDURE — 6370000000 HC RX 637 (ALT 250 FOR IP)

## 2022-02-16 PROCEDURE — C1760 CLOSURE DEV, VASC: HCPCS

## 2022-02-16 PROCEDURE — 2500000003 HC RX 250 WO HCPCS

## 2022-02-16 PROCEDURE — 2000000000 HC ICU R&B

## 2022-02-16 PROCEDURE — 2709999900 HC NON-CHARGEABLE SUPPLY

## 2022-02-16 PROCEDURE — 2580000003 HC RX 258: Performed by: SURGERY

## 2022-02-16 PROCEDURE — 2580000003 HC RX 258

## 2022-02-16 PROCEDURE — 2500000003 HC RX 250 WO HCPCS: Performed by: NURSE ANESTHETIST, CERTIFIED REGISTERED

## 2022-02-16 PROCEDURE — 86850 RBC ANTIBODY SCREEN: CPT

## 2022-02-16 PROCEDURE — 86901 BLOOD TYPING SEROLOGIC RH(D): CPT

## 2022-02-16 PROCEDURE — 33361 REPLACE AORTIC VALVE PERQ: CPT

## 2022-02-16 PROCEDURE — 85347 COAGULATION TIME ACTIVATED: CPT

## 2022-02-16 PROCEDURE — 86900 BLOOD TYPING SEROLOGIC ABO: CPT

## 2022-02-16 PROCEDURE — 2580000003 HC RX 258: Performed by: NURSE ANESTHETIST, CERTIFIED REGISTERED

## 2022-02-16 PROCEDURE — 6360000004 HC RX CONTRAST MEDICATION

## 2022-02-16 PROCEDURE — 33361 REPLACE AORTIC VALVE PERQ: CPT | Performed by: INTERNAL MEDICINE

## 2022-02-16 PROCEDURE — 6360000002 HC RX W HCPCS: Performed by: SURGERY

## 2022-02-16 PROCEDURE — C1769 GUIDE WIRE: HCPCS

## 2022-02-16 PROCEDURE — P9016 RBC LEUKOCYTES REDUCED: HCPCS

## 2022-02-16 PROCEDURE — 02RF38Z REPLACEMENT OF AORTIC VALVE WITH ZOOPLASTIC TISSUE, PERCUTANEOUS APPROACH: ICD-10-PCS | Performed by: SURGERY

## 2022-02-16 PROCEDURE — 2780000006 HC MISC HEART VALVE

## 2022-02-16 PROCEDURE — 93308 TTE F-UP OR LMTD: CPT

## 2022-02-16 PROCEDURE — 2720000010 HC SURG SUPPLY STERILE

## 2022-02-16 PROCEDURE — 6360000002 HC RX W HCPCS: Performed by: NURSE ANESTHETIST, CERTIFIED REGISTERED

## 2022-02-16 RX ORDER — ASPIRIN 81 MG/1
81 TABLET ORAL DAILY
Status: DISCONTINUED | OUTPATIENT
Start: 2022-02-17 | End: 2022-02-17 | Stop reason: HOSPADM

## 2022-02-16 RX ORDER — ONDANSETRON 2 MG/ML
4 INJECTION INTRAMUSCULAR; INTRAVENOUS EVERY 6 HOURS PRN
Status: DISCONTINUED | OUTPATIENT
Start: 2022-02-16 | End: 2022-02-17 | Stop reason: HOSPADM

## 2022-02-16 RX ORDER — MIDAZOLAM HYDROCHLORIDE 1 MG/ML
INJECTION INTRAMUSCULAR; INTRAVENOUS PRN
Status: DISCONTINUED | OUTPATIENT
Start: 2022-02-16 | End: 2022-02-16 | Stop reason: SDUPTHER

## 2022-02-16 RX ORDER — ATENOLOL 25 MG/1
50 TABLET ORAL DAILY
Status: DISCONTINUED | OUTPATIENT
Start: 2022-02-16 | End: 2022-02-17 | Stop reason: HOSPADM

## 2022-02-16 RX ORDER — LEVOTHYROXINE SODIUM 0.1 MG/1
100 TABLET ORAL DAILY
Status: DISCONTINUED | OUTPATIENT
Start: 2022-02-16 | End: 2022-02-17 | Stop reason: HOSPADM

## 2022-02-16 RX ORDER — SODIUM CHLORIDE 9 MG/ML
25 INJECTION, SOLUTION INTRAVENOUS PRN
Status: DISCONTINUED | OUTPATIENT
Start: 2022-02-16 | End: 2022-02-17 | Stop reason: HOSPADM

## 2022-02-16 RX ORDER — MIDODRINE HYDROCHLORIDE 5 MG/1
10 TABLET ORAL
Status: DISCONTINUED | OUTPATIENT
Start: 2022-02-16 | End: 2022-02-17 | Stop reason: HOSPADM

## 2022-02-16 RX ORDER — LABETALOL HYDROCHLORIDE 5 MG/ML
10 INJECTION, SOLUTION INTRAVENOUS EVERY 30 MIN PRN
Status: DISCONTINUED | OUTPATIENT
Start: 2022-02-16 | End: 2022-02-17 | Stop reason: HOSPADM

## 2022-02-16 RX ORDER — ALBUTEROL SULFATE 90 UG/1
2 AEROSOL, METERED RESPIRATORY (INHALATION) EVERY 6 HOURS PRN
Status: DISCONTINUED | OUTPATIENT
Start: 2022-02-16 | End: 2022-02-17 | Stop reason: HOSPADM

## 2022-02-16 RX ORDER — HEPARIN SODIUM 1000 [USP'U]/ML
INJECTION, SOLUTION INTRAVENOUS; SUBCUTANEOUS PRN
Status: DISCONTINUED | OUTPATIENT
Start: 2022-02-16 | End: 2022-02-16 | Stop reason: SDUPTHER

## 2022-02-16 RX ORDER — SODIUM CHLORIDE 9 MG/ML
INJECTION, SOLUTION INTRAVENOUS CONTINUOUS PRN
Status: DISCONTINUED | OUTPATIENT
Start: 2022-02-16 | End: 2022-02-16 | Stop reason: SDUPTHER

## 2022-02-16 RX ORDER — PROPOFOL 10 MG/ML
INJECTION, EMULSION INTRAVENOUS CONTINUOUS PRN
Status: DISCONTINUED | OUTPATIENT
Start: 2022-02-16 | End: 2022-02-16 | Stop reason: SDUPTHER

## 2022-02-16 RX ORDER — SODIUM CHLORIDE 9 MG/ML
INJECTION, SOLUTION INTRAVENOUS PRN
Status: COMPLETED | OUTPATIENT
Start: 2022-02-16 | End: 2022-02-16

## 2022-02-16 RX ORDER — SODIUM CHLORIDE 0.9 % (FLUSH) 0.9 %
5-40 SYRINGE (ML) INJECTION EVERY 12 HOURS SCHEDULED
Status: DISCONTINUED | OUTPATIENT
Start: 2022-02-16 | End: 2022-02-17 | Stop reason: HOSPADM

## 2022-02-16 RX ORDER — MONTELUKAST SODIUM 10 MG/1
10 TABLET ORAL NIGHTLY
Status: DISCONTINUED | OUTPATIENT
Start: 2022-02-16 | End: 2022-02-17 | Stop reason: HOSPADM

## 2022-02-16 RX ORDER — FENTANYL CITRATE 50 UG/ML
INJECTION, SOLUTION INTRAMUSCULAR; INTRAVENOUS PRN
Status: DISCONTINUED | OUTPATIENT
Start: 2022-02-16 | End: 2022-02-16 | Stop reason: SDUPTHER

## 2022-02-16 RX ORDER — BISACODYL 10 MG
10 SUPPOSITORY, RECTAL RECTAL DAILY PRN
Status: DISCONTINUED | OUTPATIENT
Start: 2022-02-16 | End: 2022-02-17 | Stop reason: HOSPADM

## 2022-02-16 RX ORDER — ROSUVASTATIN CALCIUM 5 MG/1
10 TABLET, COATED ORAL DAILY
Status: DISCONTINUED | OUTPATIENT
Start: 2022-02-16 | End: 2022-02-17 | Stop reason: HOSPADM

## 2022-02-16 RX ORDER — ASPIRIN 81 MG/1
81 TABLET, CHEWABLE ORAL DAILY
COMMUNITY

## 2022-02-16 RX ORDER — PROTAMINE SULFATE 10 MG/ML
INJECTION, SOLUTION INTRAVENOUS PRN
Status: DISCONTINUED | OUTPATIENT
Start: 2022-02-16 | End: 2022-02-16 | Stop reason: SDUPTHER

## 2022-02-16 RX ORDER — TRAMADOL HYDROCHLORIDE 50 MG/1
25 TABLET ORAL ONCE
Status: COMPLETED | OUTPATIENT
Start: 2022-02-16 | End: 2022-02-16

## 2022-02-16 RX ORDER — SODIUM CHLORIDE 0.9 % (FLUSH) 0.9 %
5-40 SYRINGE (ML) INJECTION PRN
Status: DISCONTINUED | OUTPATIENT
Start: 2022-02-16 | End: 2022-02-17 | Stop reason: HOSPADM

## 2022-02-16 RX ORDER — PROPOFOL 10 MG/ML
INJECTION, EMULSION INTRAVENOUS PRN
Status: DISCONTINUED | OUTPATIENT
Start: 2022-02-16 | End: 2022-02-16 | Stop reason: SDUPTHER

## 2022-02-16 RX ORDER — HYDRALAZINE HYDROCHLORIDE 20 MG/ML
10 INJECTION INTRAMUSCULAR; INTRAVENOUS EVERY 10 MIN PRN
Status: DISCONTINUED | OUTPATIENT
Start: 2022-02-16 | End: 2022-02-17 | Stop reason: HOSPADM

## 2022-02-16 RX ORDER — ACETAMINOPHEN 325 MG/1
650 TABLET ORAL EVERY 4 HOURS PRN
Status: DISCONTINUED | OUTPATIENT
Start: 2022-02-16 | End: 2022-02-17 | Stop reason: HOSPADM

## 2022-02-16 RX ORDER — FINASTERIDE 5 MG/1
5 TABLET, FILM COATED ORAL DAILY
Status: DISCONTINUED | OUTPATIENT
Start: 2022-02-16 | End: 2022-02-17 | Stop reason: HOSPADM

## 2022-02-16 RX ADMIN — FENTANYL CITRATE 50 MCG: 50 INJECTION, SOLUTION INTRAMUSCULAR; INTRAVENOUS at 12:55

## 2022-02-16 RX ADMIN — PHENYLEPHRINE HYDROCHLORIDE 100 MCG: 10 INJECTION INTRAVENOUS at 13:55

## 2022-02-16 RX ADMIN — DEXMEDETOMIDINE 0.6 MCG/KG/HR: 100 INJECTION, SOLUTION, CONCENTRATE INTRAVENOUS at 13:00

## 2022-02-16 RX ADMIN — PROPOFOL 20 MG: 10 INJECTION, EMULSION INTRAVENOUS at 14:33

## 2022-02-16 RX ADMIN — MONTELUKAST 10 MG: 10 TABLET, FILM COATED ORAL at 20:41

## 2022-02-16 RX ADMIN — MIDAZOLAM 2 MG: 1 INJECTION INTRAMUSCULAR; INTRAVENOUS at 12:49

## 2022-02-16 RX ADMIN — SODIUM CHLORIDE, PRESERVATIVE FREE 10 ML: 5 INJECTION INTRAVENOUS at 20:41

## 2022-02-16 RX ADMIN — PROPOFOL 20 MG: 10 INJECTION, EMULSION INTRAVENOUS at 14:34

## 2022-02-16 RX ADMIN — PHENYLEPHRINE HYDROCHLORIDE 25 MCG/MIN: 10 INJECTION INTRAVENOUS at 13:55

## 2022-02-16 RX ADMIN — ATENOLOL 50 MG: 25 TABLET ORAL at 18:07

## 2022-02-16 RX ADMIN — HEPARIN SODIUM 5000 UNITS: 1000 INJECTION, SOLUTION INTRAVENOUS; SUBCUTANEOUS at 14:01

## 2022-02-16 RX ADMIN — FENTANYL CITRATE 50 MCG: 50 INJECTION, SOLUTION INTRAMUSCULAR; INTRAVENOUS at 13:00

## 2022-02-16 RX ADMIN — PROPOFOL 50 MCG/KG/MIN: 10 INJECTION, EMULSION INTRAVENOUS at 12:50

## 2022-02-16 RX ADMIN — PROPOFOL 20 MG: 10 INJECTION, EMULSION INTRAVENOUS at 14:32

## 2022-02-16 RX ADMIN — VANCOMYCIN HYDROCHLORIDE 1000 MG: 1 INJECTION, POWDER, LYOPHILIZED, FOR SOLUTION INTRAVENOUS at 13:16

## 2022-02-16 RX ADMIN — TRAMADOL HYDROCHLORIDE 25 MG: 50 TABLET, COATED ORAL at 18:07

## 2022-02-16 RX ADMIN — SODIUM CHLORIDE: 900 INJECTION INTRAVENOUS at 13:00

## 2022-02-16 RX ADMIN — PROTAMINE SULFATE 25 MG: 10 INJECTION, SOLUTION INTRAVENOUS at 15:10

## 2022-02-16 RX ADMIN — FINASTERIDE 5 MG: 5 TABLET, FILM COATED ORAL at 18:07

## 2022-02-16 RX ADMIN — HYDRALAZINE HYDROCHLORIDE 10 MG: 20 INJECTION, SOLUTION INTRAMUSCULAR; INTRAVENOUS at 19:43

## 2022-02-16 RX ADMIN — LEVOTHYROXINE SODIUM 100 MCG: 0.1 TABLET ORAL at 18:07

## 2022-02-16 RX ADMIN — ROSUVASTATIN CALCIUM 10 MG: 5 TABLET, COATED ORAL at 18:07

## 2022-02-16 ASSESSMENT — PULMONARY FUNCTION TESTS
PIF_VALUE: 0
PIF_VALUE: 1
PIF_VALUE: 0
PIF_VALUE: 1
PIF_VALUE: 0
PIF_VALUE: 1
PIF_VALUE: 0
PIF_VALUE: 1
PIF_VALUE: 0
PIF_VALUE: 0
PIF_VALUE: 1
PIF_VALUE: 0
PIF_VALUE: 1
PIF_VALUE: 0
PIF_VALUE: 1
PIF_VALUE: 0
PIF_VALUE: 0
PIF_VALUE: 1
PIF_VALUE: 0
PIF_VALUE: 1
PIF_VALUE: 0
PIF_VALUE: 1
PIF_VALUE: 0
PIF_VALUE: 1
PIF_VALUE: 1
PIF_VALUE: 0
PIF_VALUE: 1
PIF_VALUE: 0
PIF_VALUE: 1
PIF_VALUE: 1
PIF_VALUE: 0
PIF_VALUE: 1
PIF_VALUE: 0
PIF_VALUE: 1
PIF_VALUE: 1

## 2022-02-16 ASSESSMENT — PAIN SCALES - GENERAL: PAINLEVEL_OUTOF10: 3

## 2022-02-16 NOTE — OP NOTE
Procedure : Cutaneous femoral access  aortic valve replacement ( TAVR)                      Using Medtronic Core Valve    Indication : Severe Aortic stenosis with heart failure symptoms and class III angina    Operators : Dr Shlomo Rinne / Dr Debbie Nur /Dr Chapo Boone                          Procedure description : Patient was brought to the operating Cath Lab hybrid room. Anesthesia performed anesthesia using moderate sedation   Thoracic echo was used to identify the aortic valve and findings noted. The patient was appropriately draped and prepped. Left femoral arterial access was obtained using ultrasound guidance using Seldinger technique. 6 Nicaraguan sheath was placed in the left femoral artery venous access was obtained in the left femoral vein using ultrasound guidance. We then advanced a 6 Western Hannah temporary pacer wire into the right ventricle pacing was confirmed. We then obtained arterial access using Seldinger technique under ultrasound guidance of the right femoral arteries and a 6 Nicaraguan sheath was placed in right femoral artery . Right femoral site was prepared Perclose were placed. Right femoral 6 Nicaraguan sheath was removed. After serial dilatation sheath size was increased to 14 Western Hannah. A 6 Nicaraguan pigtail was then advanced and positioned into the right noncoronary cusp for serial aortograms. AL-1 diagnostic catheter was used to advance a straight wire and aortic valve was crossed. We then exchanged for an exchange length J-wire and a pigtail was placed into the left ventricle simultaneous pressures were recorded gradient confirmed. Core valve was checked under fluoroscopy. At this point a second timeout was completed and patient was given 6000 units of heparin . We then used to confide a wire which was positioned into the left ventricle. Core valve delivery system was then advanced over the confida wire. Aortic valve was crossed in an Irish projection multiple angles were taken.   Using fluoroscopy guidance position was confirmed using serial fluoroscopic angiograms     the device was retrieved once and redeployed after doing balloon aortic valvuloplasty given that the first device  was not fully expanded    Using fluoroscopy guidance prosthesis was deployed at 80% deployment aortogram was repeated. After confirming the prosthesis position Medtronic device was implanted. Transthoracic echo and aortogram was used to confirm no significant paravalvular leak. EBL of  50 cc    Simultaneous pressures were again recorded.   Right groin site was closed with percutaneous peripheral angiogram was confirmed significant no significant stenosis  Left femoral sheath was removed Angio-Seal /pressure applied patient was given protamine for heparin reversal  Pt was transferred out to ICU

## 2022-02-16 NOTE — OP NOTE
Operative Note      Patient: Weston Ballard  YOB: 1940  MRN: [id number]    Date of Procedure: 2/16/2022    PREOPERATIVE DIAGNOSIS:  Severe symptomatic aortic stenosis with acute on chronic combined heart failure     POSTOPERATIVE DIAGNOSIS:  same       SURGEON:  Cary Granado MD    INTERVENTIONAL CARDIOLOGIST:  Isela Lisa MD     OPERATION:  BAV with 24mm Griffin Balloon  Percutaneous Transfemoral transcatheter aortic valve replacement with a 34 mm CoreValve bioprosthetic valve    Bilateral ultrasound guidance    ANESTHESIA:  General.     TISSUE REMOVED:  None. DRAINS:  None. POSITION:  Supine. FINDINGS:  There was trace PVL at the conclusion of the case. Initial peak gradient was 52. The concluding peak gradient was 0 and mean gradient was 0. DESCRIPTION OF OPERATION:  The patient was brought to the hybrid operating room by the anesthesia department. Anesthesia department performed endotracheal intubation with general anesthesia. Radial A-line was placed and the NANETTE probe was then inserted. The patient was prepped and draped. Ultrasound was used to identify bilateral common femoral arteries, bifurcations and veins. Targets identified and images saved. Venous access was obtained in the R CFV under ultrasound guidance, using seldinger technique and a 6 Fr sheath placed. The temporary transvenous pacemaker was placed via the sheath and pacing thresholds were obtained. Contralaterally, under ultrasound guidance and using Seldinger technique, the femoral artery were percutaneously accessed and a 6-Cypriot sheath was placed through the left femoral artery. A pigtail catheter was placed over a guidewire in the aorta. The pigtail catheter was positioned in the non coronary cusp. Under ultrasound guidance, the ipsilateral femoral artery was percutaneously accessed with a micropuncture needle. A micropuncture catheter was placed and exchanged for a 6 Western Hannah dilator.    Over a guidewire Perclose was used to pre-close the artery. A 9 Fr sheath was then placed. Ipsilaterally, an AL1 catheter was then used to insert an Amplatz wire into the descending thoracic aorta. A 18 Bulgarian dilator was then used over the Lunderquist wire. The 14-Bulgarian sheath was then placed. The AL1 was then advanced over the guidewire into the aortic root and using an 0.038 straight wire the aortic valve was then crossed. Using exchange length catheter, the AL1 catheter was then exchanged for a pigtail. The pigtail was used to measure gradients and then used to position the Lunderquist wire with an pigtail in the apex of the LV. The position was then confirmed on echo and was repositioned to find the optimal positioning. The CoreValve was checked under fluoro. We then exchanged the 18 Fr sheath for the 34mm CoreValve delivery system, which had been prepped on the back table. We then crossed the aortic arch in ZACARIAS. We then confirmed our coplanar view, prior to crossing the valve with the prosthesis. After adjusting coaxiality, we began deploying the valve. When the prosthesis was approximately 1/3 deployed, we paced the patient at 110 bpm.  At 80% deployment, the valve was felt to be constrained. Therefore the valve was removed and decision made to BAV. Delivery system was removed and exchanged for sheath. A 24 azra balloon was then used while rapid pacing with good result. We then exchanged the 18 Fr sheath for the 34mm CoreValve delivery system, which had been prepped on the back table. We then crossed the aortic arch in ZACARIAS. We then confirmed our coplanar view, prior to crossing the valve with the prosthesis. After adjusting coaxiality, we began deploying the valve. When the prosthesis was approximately 1/3 deployed, we paced the patient at 110 bpm.  At 80% deployment, aortography and echo guidance was used to confirm good valve position.   The nose cone was centralized and the valve completely deployed. The wire was then withdrawn through the nose cone. Echo and angio were used to assess the PVL. The contralateral pigtail was used to cross the valve to record gradient. The delivery system was then removed, and there was noted to be no significant PVL. The pigtail was pulled back into the distal abdominal aorta. The large sheath was removed for the right common femoral artery while tightening the Percloses. Hemostasis appeared adequate. An angiogram was performed and showed good flow through femoral.  The sheath was then removed from the left common femoral artery. Protamine was given to reverse the heparin effect. Pressure was then held on both groins and there was evidence of adequate hemostasis. EBL was minimal. Sterile pressure dressings were then placed over both groins. The pulses were checked at the conclusion of the case and were unchanged. The patient tolerated the procedure well and was transported to the CVICU in stable condition.     Complications: None    Specimens:   * No specimens in log *    Implants:  * No implants in log *      Drains:   Urethral Catheter Straight-tip (Active)     Electronically signed by Danisha Galdamez MD on 2/16/2022 at 3:27 PM

## 2022-02-16 NOTE — ANESTHESIA PROCEDURE NOTES
Arterial Line:    An arterial line was placed using surface landmarks, in the OR for the following indication(s): continuous blood pressure monitoring and blood sampling needed. A 20 gauge (size), 1 and 3/4 inch (length), Arrow (type) catheter was placed, Seldinger technique not used, into the right radial artery, secured by tape and Tegaderm. Anesthesia type: Local  Local infiltration: Injection    Events:  patient tolerated procedure well with no complications and EBL 0mL.  2/16/2022 1:00 PM2/16/2022 1:06 PM  Other anesthesia staff:  Vargas Doe RN  Preanesthetic Checklist  Completed: patient identified, IV checked, site marked, risks and benefits discussed, surgical consent, monitors and equipment checked, pre-op evaluation, timeout performed, anesthesia consent given, oxygen available and patient being monitored

## 2022-02-16 NOTE — H&P
Patient was seen in pre-op. I have reviewed the history and physical.  I have examined the patient today. Patient with acute on chronic diastolic heart failure secondary to severe AS as demonstrated by cardiomegaly, exertional dyspnea, and peripheral edema. Plan to Proceed with TAVR for AS. The patient was counseled at length about the risks of rui Covid-19 during their perioperative period and any recovery window from their procedure. The patient was made aware that rui Covid-19  may worsen their prognosis for recovering from their procedure  and lend to a higher morbidity and/or mortality risk. All material risks, benefits, and reasonable alternatives including postponing the procedure were discussed. The patient does wish to proceed with the procedure at this time.       There are no changes noted from the recent H & P.

## 2022-02-17 ENCOUNTER — APPOINTMENT (OUTPATIENT)
Dept: GENERAL RADIOLOGY | Age: 82
DRG: 266 | End: 2022-02-17
Attending: SURGERY
Payer: MEDICARE

## 2022-02-17 VITALS
TEMPERATURE: 96.3 F | OXYGEN SATURATION: 97 % | HEART RATE: 64 BPM | BODY MASS INDEX: 20.74 KG/M2 | RESPIRATION RATE: 12 BRPM | SYSTOLIC BLOOD PRESSURE: 131 MMHG | WEIGHT: 148.15 LBS | HEIGHT: 71 IN | DIASTOLIC BLOOD PRESSURE: 63 MMHG

## 2022-02-17 PROBLEM — I35.0 AORTIC STENOSIS, SEVERE: Status: ACTIVE | Noted: 2022-02-17

## 2022-02-17 LAB
ANION GAP SERPL CALCULATED.3IONS-SCNC: 12 MMOL/L (ref 4–16)
BUN BLDV-MCNC: 20 MG/DL (ref 6–23)
CALCIUM SERPL-MCNC: 9.2 MG/DL (ref 8.3–10.6)
CHLORIDE BLD-SCNC: 99 MMOL/L (ref 99–110)
CO2: 22 MMOL/L (ref 21–32)
CREAT SERPL-MCNC: 1.1 MG/DL (ref 0.9–1.3)
GFR AFRICAN AMERICAN: >60 ML/MIN/1.73M2
GFR NON-AFRICAN AMERICAN: >60 ML/MIN/1.73M2
GLUCOSE BLD-MCNC: 132 MG/DL (ref 70–99)
HCT VFR BLD CALC: 40.1 % (ref 42–52)
HEMOGLOBIN: 13.6 GM/DL (ref 13.5–18)
LV EF: 50 %
LVEF MODALITY: NORMAL
MCH RBC QN AUTO: 31.1 PG (ref 27–31)
MCHC RBC AUTO-ENTMCNC: 33.9 % (ref 32–36)
MCV RBC AUTO: 91.8 FL (ref 78–100)
PDW BLD-RTO: 11.9 % (ref 11.7–14.9)
PLATELET # BLD: 158 K/CU MM (ref 140–440)
PMV BLD AUTO: 10 FL (ref 7.5–11.1)
POTASSIUM SERPL-SCNC: 3.6 MMOL/L (ref 3.5–5.1)
RBC # BLD: 4.37 M/CU MM (ref 4.6–6.2)
SODIUM BLD-SCNC: 133 MMOL/L (ref 135–145)
WBC # BLD: 11.3 K/CU MM (ref 4–10.5)

## 2022-02-17 PROCEDURE — 85027 COMPLETE CBC AUTOMATED: CPT

## 2022-02-17 PROCEDURE — 97530 THERAPEUTIC ACTIVITIES: CPT

## 2022-02-17 PROCEDURE — 97162 PT EVAL MOD COMPLEX 30 MIN: CPT

## 2022-02-17 PROCEDURE — 6370000000 HC RX 637 (ALT 250 FOR IP): Performed by: SURGERY

## 2022-02-17 PROCEDURE — 80048 BASIC METABOLIC PNL TOTAL CA: CPT

## 2022-02-17 PROCEDURE — 99239 HOSP IP/OBS DSCHRG MGMT >30: CPT | Performed by: PHYSICIAN ASSISTANT

## 2022-02-17 PROCEDURE — 71045 X-RAY EXAM CHEST 1 VIEW: CPT

## 2022-02-17 PROCEDURE — 97166 OT EVAL MOD COMPLEX 45 MIN: CPT

## 2022-02-17 PROCEDURE — 93306 TTE W/DOPPLER COMPLETE: CPT

## 2022-02-17 PROCEDURE — 97116 GAIT TRAINING THERAPY: CPT

## 2022-02-17 PROCEDURE — 99232 SBSQ HOSP IP/OBS MODERATE 35: CPT | Performed by: INTERNAL MEDICINE

## 2022-02-17 RX ORDER — ATENOLOL 50 MG/1
50 TABLET ORAL DAILY
Qty: 30 TABLET | Refills: 3 | Status: SHIPPED | OUTPATIENT
Start: 2022-02-18 | End: 2022-03-03 | Stop reason: SDUPTHER

## 2022-02-17 RX ADMIN — ATENOLOL 50 MG: 25 TABLET ORAL at 07:40

## 2022-02-17 RX ADMIN — ACETAMINOPHEN 650 MG: 325 TABLET ORAL at 04:46

## 2022-02-17 RX ADMIN — ASPIRIN 81 MG: 81 TABLET, COATED ORAL at 07:40

## 2022-02-17 RX ADMIN — ROSUVASTATIN CALCIUM 10 MG: 5 TABLET, COATED ORAL at 07:40

## 2022-02-17 RX ADMIN — LEVOTHYROXINE SODIUM 100 MCG: 0.1 TABLET ORAL at 07:40

## 2022-02-17 RX ADMIN — FINASTERIDE 5 MG: 5 TABLET, FILM COATED ORAL at 07:40

## 2022-02-17 ASSESSMENT — PAIN SCALES - GENERAL
PAINLEVEL_OUTOF10: 0
PAINLEVEL_OUTOF10: 3

## 2022-02-17 NOTE — CONSULTS
[address], 1940, 2119/2119-A, 2/17/2022    History  [place]:  There were no encounter diagnoses. Patient  has a past medical history of A-fib ([address] Acute asthmatic bronchitis, Acute MI ([address] CAD (coronary artery disease), Colon polyp, Diverticulosis, Esophagitis, Hemochromatosis, Hiatal hernia, Hx of blood clots, Hyperlipidemia, Hypertension, Internal hemorrhoids, Mild intermittent asthma, Mild persistent asthma without complication, Phlebitis, Shortness of breath, and SVT (supraventricular tachycardia) ([address] Patient  has a past surgical history that includes Coronary artery bypass graft; Pacemaker insertion; hernia repair; Toe Surgery; Tonsillectomy; Vasectomy; Total knee arthroplasty; Colonoscopy (7-28-14); and Upper gastrointestinal endoscopy (7-28-14). Subjective:  Patient states:  Agreeable to therapy. Pain:  Denied. Communication with other providers: co-eval with OT. Restrictions: General precautions, fall risk    Home Setup/Prior level of function  Social/Functional History  Lives With: Spouse  Type of Home: House  Home Layout: One level  Home Access: Stairs to enter with rails  Entrance Stairs - Number of Steps: 1 step  Bathroom Shower/Tub: Tub/Shower unit,Walk-in shower  Bathroom Toilet: Handicap height  Bathroom Equipment: Grab bars in shower,Shower chair,Grab bars around toilet  Home Equipment: Rolling walker,Cane (Pt typically ambulates without AD.)  ADL Assistance: Independent  Homemaking Assistance: Independent  Homemaking Responsibilities: Yes (Wife cooks, pt assists with cleaning.)  Ambulation Assistance: Independent  Transfer Assistance: Independent  Active : Yes (Pt has an active license, however he has not utilized it since October 28th.)  Mode of Transportation: Car  Leisure & Hobbies: Working out at [employer].     Examination of body systems (includes body structures/functions, activity/participation limitations):  · Observation:  Upright in recliner chair upon arrival.   · Vision:  [place]  · Hearing:  [place], hearing aide. · Cardiopulmonary:  WFL  · Cognition: WFL, see OT/SLP note for further evaluation. Musculoskeletal  · ROM R/L:  B LEs WFL. · Strength R/L: Not formally assessed, at least against gravity strength in B LEs. Mobility/Treatment:  · Rolling L/R:  NT  · Supine to sit:  NT  · Transfers:   · Sit<>stand: to/from recliner chair CGA. · Sitting balance: SBA for static sitting balance in recliner chair ~5 minutes w B UE support. · Standing balance: CGA w RW for static standing balance ~ 1 minutes. · Gait: Pt ambulated ~450' CGA - SBA for balance and safety w no AD. Pt presented w forward flex posture and slight decrease in step length. Verbal cues for upright posture w fair return demo. [facility] 6 Clicks Inpatient Mobility:  AM-PAC Inpatient Mobility Raw Score : 18    Safety: patient left upright in recliner chair, call light within reach, gait belt used. Assessment:  Pt is an 79year old male who was admitted to [facility] on 2/16/2022 d/t severe symptomatic aortic stenosis w acute on chronic combined HF. Pt currently being treated s/p percutaneous transfemoral transcatheter aortic valve replacement. At baseline, pt is indep for all ADLs, IADLs, and functional mobility. Currently, pt presents near baseline. They would benefit from continued ambulation w nursing staff to prevent deconditioning. Pt is safe to return home w assist PRN and initial increase in supervision. Complexity: Moderate  Prognosis: Good, no significant barriers to participation at this time.    Discharge Recommendations: Home with assist PRN  Equipment: n/a    Recommendations for NURSING mobility: CGA w gait belt     Time:   Time in: 0927  Time out: 0945  Timed treatment minutes: 8  Total time: 18    Electronically signed by:    MEGHA Clayton  2/17/2022, 11:10 AM

## 2022-02-17 NOTE — PROGRESS NOTES
Eldon Sandoval BSN RN clinical  for [facility] SURGICAL [facility] RN students 07-19:00 this date.

## 2022-02-17 NOTE — PROGRESS NOTES
Comprehensive Nutrition Assessment    Type and Reason for Visit:  Initial (lower BMI for age)    Nutrition Recommendations/Plan:   · Continue Cardiac Diet  · Begin low saul high protein oral nutrition supplement bid, between meals as desired  · Nutrition focused physical exam for malnutrition at reassess    Nutrition Assessment:  Pt was admitted with severe aortic stenosis, POD 1 s/p TAVR. H/O CAD/CABG, diverticulosis. A Cardiac Diet was ordered this morning. Weight appears stable per Epic history. Pt is sleeping during my room visit. Will order oral supplement to optimize po intake and continue to follow as moderate nutrition risk. Malnutrition Assessment:  Malnutrition Status:  Insufficient data    Context:  Acute Illness       Estimated Daily Nutrient Needs:  Energy (kcal):  9121-2457 (30-35 kcal/kg); Weight Used for Energy Requirements:  Current     Protein (g):  80-94 (1.2-1.4 g/kg); Weight Used for Protein Requirements:  Current        Fluid (ml/day):  6903-2010; Method Used for Fluid Requirements:  1 ml/kcal      Nutrition Related Findings:      Wounds:  None       Current Nutrition Therapies:    ADULT DIET;  Regular; Low Fat/Low Chol/High Fiber/2 gm Na    Anthropometric Measures:  · Height: 5' 11\" (180.3 cm)  · Current Body Weight: 148 lb 2.4 oz (67.2 kg)   · Admission Body Weight: 148 lb 2.4 oz (67.2 kg)    · Usual Body Weight: 147 lb (66.7 kg) (3/29/21)     · Ideal Body Weight: 172 lbs; % Ideal Body Weight 86.1 %   · BMI: 20.7  · BMI Categories: Underweight (BMI less than 22) age over 72       Nutrition Diagnosis:   · Predicted inadequate energy intake related to cardiac dysfunction as evidenced by BMI    Nutrition Interventions:   Food and/or Nutrient Delivery:  Continue Current Diet,Start Oral Nutrition Supplement  Nutrition Education/Counseling:  No recommendation at this time   Coordination of Nutrition Care:  Continue to monitor while inpatient    Goals:  Pt will consume at least 75% of her meals and supplements       Nutrition Monitoring and Evaluation:   Behavioral-Environmental Outcomes:  None Identified   Food/Nutrient Intake Outcomes:  Food and Nutrient Intake,Supplement Intake  Physical Signs/Symptoms Outcomes:  Biochemical Data,GI Status,Meal Time Behavior,Nutrition Focused Physical Findings,Skin,Weight     Discharge Planning:    No discharge needs at this time     Electronically signed by Joselyn Rubin RD, LD on 2/17/22 at 12:36 PM EST    Contact: [phone]

## 2022-02-17 NOTE — PROGRESS NOTES
CARDIOLOGY  NOTE        Name:  Cheri Grossman /Age/Sex: 1940  (79 y.o. male)   MRN & CSN:  [id number] & [id number] Admission Date/Time: 2022  9:33 AM   Location:  -A PCP: Magy Clark MD       Hospital Day: 2        PLAN FROM CARDIOLOGY FOR TODAY:   Check labs, check EKG, possible DC      - cardiology consult is for: Post TAVR    -  Interval history: Had TAVR yesterday    · ASSESSMENT/ PLAN:  1. Severe aortic stenosis: Patient had TAVR yesterday remained stable  2. Coronary artery disease: Patient has history of CABG and is being maintained on medical therapy with the atenolol, aspirin  3. Hyperlipidemia patient is on Crestor last LDL on file is 77  4. Groin post TAVR is stable no bleeding issues  5. Hemoglobin this morning is normal await chemistries          Subjective: Todays complain: Patient no chest pain    HPI:  Chris Pittman is a 79 y. o.year old who and presents with with admission for TAVR          Objective: Temperature:  Current - Temp: 96.3 °F (35.7 °C);  Max - Temp  Av.3 °F (35.7 °C)  Min: 96.3 °F (35.7 °C)  Max: 96.3 °F (35.7 °C)    Respiratory Rate : Resp  Av  Min: 16  Max: 16    Pulse Range: Pulse  Av.1  Min: 60  Max: 97    Blood Presuure Range:  Systolic (34YEM), VMI:395 , Min:108 , NHC:504   ; Diastolic (35FWP), NHO:59, Min:48, Max:96      Pulse ox Range: SpO2  Av.4 %  Min: 91 %  Max: 100 %    24hr I & O:    Intake/Output Summary (Last 24 hours) at 2022 0615  Last data filed at 2022 0435  Gross per 24 hour   Intake 1850 ml   Output 3150 ml   Net -1300 ml         BP (!) 108/53   Pulse 62   Temp 96.3 °F (35.7 °C) (Temporal)   Resp 16   Ht 5' 11\" (1.803 m)   Wt 148 lb 2.4 oz (67.2 kg)   SpO2 95%   BMI 20.66 kg/m²           Review of Systems:    No cp    TELEMETRY: paced    has a past medical history of A-fib [address] [address] Acute asthmatic bronchitis, Acute MI ([facility] [address] CAD (coronary artery disease), Colon polyp, Diverticulosis, Esophagitis, Hemochromatosis, Hiatal hernia, Hx of blood clots, Hyperlipidemia, Hypertension, Internal hemorrhoids, Mild intermittent asthma, Mild persistent asthma without complication, Phlebitis, Shortness of breath, and SVT (supraventricular tachycardia) ([facility] [address] has a past surgical history that includes Coronary artery bypass graft; Pacemaker insertion; hernia repair; Toe Surgery; Tonsillectomy; Vasectomy; Total knee arthroplasty; Colonoscopy (7-28-14); and Upper gastrointestinal endoscopy (7-28-14). Physical Exam:  General:  Awake, alert, NAD  Head:normal  Eye: Pupils equal and round  Neck:  No JVD, no carotid bruit noted   Chest:  Clear to auscultation, no signs of respiratory distress  Cardiovascular:  Normal rate and rhythm. S1 and S2 noted. No murmurs rubs or gallops  Abdomen:   nontender  Extremities:  tr edema  Pulses; palpable  Neuro: grossly normal    Medications:    atenolol  50 mg Oral Daily    finasteride  5 mg Oral Daily    levothyroxine  100 mcg Oral Daily    midodrine  10 mg Oral TID WC    montelukast  10 mg Oral Nightly    rosuvastatin  10 mg Oral Daily    sodium chloride flush  5-40 mL IntraVENous 2 times per day    aspirin  81 mg Oral Daily      sodium chloride       albuterol sulfate HFA, sodium chloride flush, sodium chloride, acetaminophen, ondansetron, bisacodyl, hydrALAZINE, labetalol    Lab Data:  CBC:   Recent Labs     02/17/22  0515   WBC 11.3*   HGB 13.6   HCT 40.1*   MCV 91.8        BMP: No results for input(s): NA, K, CL, CO2, PHOS, BUN, CREATININE, CA in the last 72 hours. LIVER PROFILE: No results for input(s): AST, ALT, LIPASE, BILIDIR, BILITOT, ALKPHOS in the last 72 hours. Invalid input(s): AMYLASE,  ALB  PT/INR: No results for input(s): PROTIME, INR in the last 72 hours. APTT: No results for input(s): APTT in the last 72 hours. BNP:  No results for input(s): BNP in the last 72 hours.   TROPONIN: No results for input(s): TROPONINI in the last 72 hours. No results for input(s): TROPONINT in the last 72 hours. Labs, consult, tests reviewed                    Julio Byrne MD, PA-C 2/17/2022 6:15 AM     Please note this report has been partially produced using speech recognition software and may contain errors related to that system including errors in grammar, punctuation, and spelling, as well as words and phrases that may be inappropriate. If there are any questions or concerns please feel free to contact the dictating provider for clarification.

## 2022-02-17 NOTE — DISCHARGE SUMMARY
Discharge Summary     Patient ID  Cheri Grossman   1940  [id number]      Primary Care Doctor:  Magy Clark MD    Admit date: 2/16/2022   Discharge date: 2/17/2022      Admitting Physician: Herberth North MD   Discharge Physician: Panfilo Musa PA-C    Discharge Diagnoses: Active Hospital Problems    Diagnosis Date Noted    Aortic stenosis, severe [I35.0] 02/17/2022     Discharged Condition: stable. Hospital Course:. Underwent surgery of TAVR after discussion and preparation. Post op ; monitored rhythm, O2 sat, I/O, Labs, CXRs serially  Neuro status , BP and vital signs monitored  Started on diet and activity. At discharge, pt ambulating, on RA.      Special concerns: none  Further plans after discharge: follow in valve clinic in 1 week    VS at discharge   Vitals:    02/17/22 1000   BP: 133/76   Pulse: 67   Resp:    Temp:    SpO2:        Consults: cardiology    Disposition: home    Patient Instructions:      Medication List      CHANGE how you take these medications    atenolol 50 MG tablet  Commonly known as: TENORMIN  Take 1 tablet by mouth daily  Start taking on: February 18, 2022  What changed:   · medication strength  · how much to take        CONTINUE taking these medications    acetaminophen 500 MG tablet  Commonly known as: TYLENOL     * albuterol sulfate  (90 Base) MCG/ACT inhaler  Commonly known as: ProAir HFA  Inhale 2 puffs into the lungs every 6 hours as needed for Wheezing     * albuterol sulfate  (90 Base) MCG/ACT inhaler  Commonly known as: ProAir HFA  Inhale 2 puffs into the lungs every 6 hours as needed for Wheezing     * albuterol sulfate  (90 Base) MCG/ACT inhaler  Commonly known as: ProAir HFA  Inhale 2 puffs into the lungs every 6 hours as needed for Wheezing     aspirin 81 MG chewable tablet     cetirizine 10 MG tablet  Commonly known as: ZYRTEC     cimetidine 200 MG tablet  Commonly known as: TAGAMET     Crestor 10 MG tablet  Generic drug: rosuvastatin     Eliquis 5 MG Tabs tablet  Generic drug: apixaban     finasteride 5 MG tablet  Commonly known as: PROSCAR     levothyroxine 100 MCG tablet  Commonly known as: SYNTHROID     midodrine 10 MG tablet  Commonly known as: PROAMATINE  Take 1 tablet by mouth 3 times daily (with meals)     montelukast 10 MG tablet  Commonly known as: SINGULAIR     Nasonex 50 MCG/ACT nasal spray  Generic drug: mometasone     nystatin 198830 UNIT/GM cream  Commonly known as: MYCOSTATIN     potassium citrate 10 MEQ (1080 MG) extended release tablet  Commonly known as: UROCIT-K     raNITIdine 150 MG tablet  Commonly known as: ZANTAC  Take 2 tablets by mouth nightly     traMADol 50 MG tablet  Commonly known as: ULTRAM     Viagra 100 MG tablet  Generic drug: sildenafil     vitamin D 1000 UNIT Tabs tablet  Commonly known as: CHOLECALCIFEROL         * This list has 3 medication(s) that are the same as other medications prescribed for you. Read the directions carefully, and ask your doctor or other care provider to review them with you.             STOP taking these medications    chlorthalidone 25 MG tablet  Commonly known as: HYGROTON     clopidogrel 75 MG tablet  Commonly known as: PLAVIX           Where to Get Your Medications      These medications were sent to [facility] [address], [address]    Phone: [phone]   · atenolol 50 MG tablet       Activity: activity as tolerated and no lifting, Driving, or Strenuous exercise until seen by physician in office  Diet: cardiac diet  Wound Care: as directed    Follow-up as directed at discharge    Signed: Jayshree Pereyra PA-C    Time spent on discharge 35 minutes

## 2022-02-17 NOTE — PROGRESS NOTES
Jennie Musa BSN RN clinical  for [facility] SURGICAL [facility] RN students 07-19:00 this date.

## 2022-02-17 NOTE — CONSULTS
[facility] THERAPY EVALUATION    Cheri Grossman, 1940, 2119/2119-A, 2/17/2022    Discharge Recommendation: Home with initial 24 hour supervision/assistance, PRN assistance. History:  [place]:  There were no encounter diagnoses. Subjective:  Patient states: Agreeable to therapy. Pain: Pt denied pain this date (0/10). Communication with other providers: PT, RN, LSW  Restrictions: General Precautions, Fall Risk    Home Setup/Prior level of function:  Social/Functional History  Lives With: Spouse  Type of Home: House  Home Layout: One level  Home Access: Stairs to enter with rails  Entrance Stairs - Number of Steps: 1 step  Bathroom Shower/Tub: Tub/Shower unit,Walk-in shower  Bathroom Toilet: Handicap height  Bathroom Equipment: Grab bars in shower,Shower chair,Grab bars around toilet  Home Equipment: [address] (Pt typically ambulates without AD.)  ADL Assistance: Independent  Homemaking Assistance: Independent  Homemaking Responsibilities: Yes (Wife cooks, pt assists with cleaning.)  Ambulation Assistance: Independent  Transfer Assistance: Independent  Active : Yes (Pt has an active license, however he has not utilized it since October 28th.)  Mode of Transportation: Car  Leisure & Hobbies: Working out at [employer]. Examination:  · Observation: Seated upright in chair upon arrival.   · Vision: [facility]  · Hearing: [facility]  · Vitals: Stable vitals throughout session    Body Systems and functions:  · ROM: WFL   · Strength: WFL  · Sensation: WFL  · Tone: Normal  · Coordination: WFL  · Perception: WNL    Activities of Daily Living (ADLs):  · Feeding: [place]  setup and increased time. · Grooming: SUP in standing with setup, increased time, VC.   · UB bathing: [place]  in seated with setup and increased time. · LB bathing: [place]  in seated with setup and increased time. · UB dressing: [place]  in seated with setup and increased time.    · LB dressing: [place]  in seated with setup and increased time. · Toileting: SBA during commode transfers and while balancing in standing to complete diane care and LB clothing management. Cognitive and Psychosocial Functioning:  · Overall cognitive status: Oriented to time, date, person, place, city  · Affect: Normal     Balance:   · Sitting: SUP (pt sat upright in chair demo good static sitting balance). · Standing: SBA (pt stood without use of AD. Demo good dynamic standing balance). Functional Mobility:   · Bed Mobility: NT this date. Pt was received seated upright in chair upon arrival.  · Transfers: SBA  (pt performed STS from chair without use of AD. Required VC throughout for sequencing and increased time). · Ambulation: SBA - CGA (pt ambulated approx 400ft without use of AD. Demo good pace throughout). AM-PAC 6 click short form for inpatient daily activity:   How much help from another person does the patient currently need. .. Unable  Dep A Lot  Max A A Lot   Mod A A Little  Min A A Little   CGA  SBA None   Mod I  Indep  Sup   1. Putting on and taking off regular lower body clothing? [] 1    [] 2   [] 2   [] 3   [] 3   [x] 4      2. Bathing (including washing, rinsing, drying)? [] 1   [] 2   [] 2 [] 3 [] 3 [x] 4   3. Toileting, which includes using toilet, bedpan, or urinal? [] 1    [] 2   [] 2   [] 3   [x] 3   [] 4     4. Putting on and taking off regular upper body clothing? [] 1   [] 2   [] 2   [] 3   [] 3    [x] 4      5. Taking care of personal grooming such as brushing teeth? [] 1   [] 2    [] 2 [] 3    [] 3   [x] 4      6. Eating meals? [] 1   [] 2   [] 2   [] 3   [] 3   [x] 4      Raw Score:  23   [24=0% impaired(CH), 23=1-19%(CI), 20-22=20-39%(CJ), 15-19=40-59%(CK), 10-14=60-79%(CL), 7-9=80-99%(CM), 6=100%(CN)]    Treatment:  Therapeutic Activity Training:   Therapeutic activity training was instructed today. Cues were given for safety, sequence, UE/LE placement, awareness, and balance.     Activities performed today included bed mobility training, sup-sit, sit-stand, ambulation. Safety Measures: Gait belt used, Left in chair, Alarm in place    Assessment:  Assessment  Treatment Diagnosis: non rheumatic aortic valve stenosis  Prognosis: Good  Decision Making: Medium Complexity  REQUIRES OT FOLLOW UP: No  Discharge Recommendations: Home with assist PRN    Pt is an 79year old M admitted for non rheumatic aortic valve stenosis. Pt underwent surgical intervention (TAVR w/ anesthesia) on 2/16/2022. Pt reports that prior to admission he was IND in ADLs, IADLs, and functional mobility. This date, pt demo good functional mobility for ADL status and appears to be functioning at or near baseline. Pt reports having good support (wife) at home. No OT needs at this time. Recommend d/c home with initial 24 hour SUP/assist, PRN assist.     Time:   Time in: 927  Time out: 945  Timed treatment minutes: 8  Total time: 18      Electronically signed by:       ARCADIO Mckeon/L, [address] [place] [address], .[phone]

## 2022-02-17 NOTE — PROGRESS NOTES
Discharge paperwork reviewed with patient and wife. Pt discharged in a privately owned vehicle in no apparent distress at the time of discharge. Patient was discharged with all of his personal belongings.

## 2022-02-18 LAB
ABO/RH: NORMAL
ANTIBODY SCREEN: NEGATIVE
COMMENT: NORMAL
COMPONENT: NORMAL
COMPONENT: NORMAL
CROSSMATCH RESULT: NORMAL
CROSSMATCH RESULT: NORMAL
STATUS: NORMAL
STATUS: NORMAL
TRANSFUSION STATUS: NORMAL
TRANSFUSION STATUS: NORMAL
UNIT DIVISION: 0
UNIT DIVISION: 0
UNIT NUMBER: NORMAL
UNIT NUMBER: NORMAL

## 2022-02-23 ENCOUNTER — HOSPITAL ENCOUNTER (OUTPATIENT)
Dept: CARDIAC CATH/INVASIVE PROCEDURES | Age: 82
Discharge: HOME | End: 2022-02-23
Attending: SURGERY | Admitting: SURGERY
Payer: MEDICARE

## 2022-02-23 ENCOUNTER — HOSPITAL ENCOUNTER (OUTPATIENT)
Age: 82
Discharge: HOME | End: 2022-02-23
Payer: MEDICARE

## 2022-02-23 ENCOUNTER — APPOINTMENT (OUTPATIENT)
Dept: ULTRASOUND IMAGING | Age: 82
End: 2022-02-23
Attending: SURGERY
Payer: MEDICARE

## 2022-02-23 VITALS
SYSTOLIC BLOOD PRESSURE: 140 MMHG | DIASTOLIC BLOOD PRESSURE: 63 MMHG | RESPIRATION RATE: 14 BRPM | WEIGHT: 144 LBS | OXYGEN SATURATION: 99 % | HEART RATE: 63 BPM | BODY MASS INDEX: 20.16 KG/M2 | HEIGHT: 71 IN

## 2022-02-23 DIAGNOSIS — I35.0 AORTIC STENOSIS, SEVERE: ICD-10-CM

## 2022-02-23 DIAGNOSIS — Z95.2 STATUS POST TRANSCATHETER AORTIC VALVE REPLACEMENT: Primary | ICD-10-CM

## 2022-02-23 LAB
ALBUMIN SERPL-MCNC: 4 GM/DL (ref 3.4–5)
ALP BLD-CCNC: 64 IU/L (ref 40–128)
ALT SERPL-CCNC: 14 U/L (ref 10–40)
ANION GAP SERPL CALCULATED.3IONS-SCNC: 14 MMOL/L (ref 4–16)
APTT: 43 SECONDS (ref 25.1–37.1)
AST SERPL-CCNC: 26 IU/L (ref 15–37)
BASOPHILS ABSOLUTE: 0 K/CU MM
BASOPHILS RELATIVE PERCENT: 0.5 % (ref 0–1)
BILIRUB SERPL-MCNC: 0.5 MG/DL (ref 0–1)
BUN BLDV-MCNC: 31 MG/DL (ref 6–23)
CALCIUM SERPL-MCNC: 9.4 MG/DL (ref 8.3–10.6)
CHLORIDE BLD-SCNC: 102 MMOL/L (ref 99–110)
CO2: 22 MMOL/L (ref 21–32)
CREAT SERPL-MCNC: 1.2 MG/DL (ref 0.9–1.3)
DIFFERENTIAL TYPE: ABNORMAL
EOSINOPHILS ABSOLUTE: 0.2 K/CU MM
EOSINOPHILS RELATIVE PERCENT: 2.5 % (ref 0–3)
GFR AFRICAN AMERICAN: >60 ML/MIN/1.73M2
GFR NON-AFRICAN AMERICAN: 58 ML/MIN/1.73M2
GLUCOSE BLD-MCNC: 94 MG/DL (ref 70–99)
HCT VFR BLD CALC: 40.1 % (ref 42–52)
HEMOGLOBIN: 12.5 GM/DL (ref 13.5–18)
IMMATURE NEUTROPHIL %: 0.3 % (ref 0–0.43)
INR BLD: 1.21 INDEX
LYMPHOCYTES ABSOLUTE: 0.7 K/CU MM
LYMPHOCYTES RELATIVE PERCENT: 11.2 % (ref 24–44)
MCH RBC QN AUTO: 30.3 PG (ref 27–31)
MCHC RBC AUTO-ENTMCNC: 31.2 % (ref 32–36)
MCV RBC AUTO: 97.1 FL (ref 78–100)
MONOCYTES ABSOLUTE: 0.6 K/CU MM
MONOCYTES RELATIVE PERCENT: 9.4 % (ref 0–4)
NUCLEATED RBC %: 0 %
PDW BLD-RTO: 12.1 % (ref 11.7–14.9)
PLATELET # BLD: 178 K/CU MM (ref 140–440)
PMV BLD AUTO: 9.6 FL (ref 7.5–11.1)
POTASSIUM SERPL-SCNC: 4 MMOL/L (ref 3.5–5.1)
PRO-BNP: 7540 PG/ML
PROTHROMBIN TIME: 15.7 SECONDS (ref 11.7–14.5)
RBC # BLD: 4.13 M/CU MM (ref 4.6–6.2)
SEGMENTED NEUTROPHILS ABSOLUTE COUNT: 4.9 K/CU MM
SEGMENTED NEUTROPHILS RELATIVE PERCENT: 76.1 % (ref 36–66)
SODIUM BLD-SCNC: 138 MMOL/L (ref 135–145)
TOTAL IMMATURE NEUTOROPHIL: 0.02 K/CU MM
TOTAL NUCLEATED RBC: 0 K/CU MM
TOTAL PROTEIN: 6.8 GM/DL (ref 6.4–8.2)
WBC # BLD: 6.5 K/CU MM (ref 4–10.5)

## 2022-02-23 PROCEDURE — 83880 ASSAY OF NATRIURETIC PEPTIDE: CPT

## 2022-02-23 PROCEDURE — 85610 PROTHROMBIN TIME: CPT

## 2022-02-23 PROCEDURE — 93926 LOWER EXTREMITY STUDY: CPT

## 2022-02-23 PROCEDURE — 80053 COMPREHEN METABOLIC PANEL: CPT

## 2022-02-23 PROCEDURE — 85027 COMPLETE CBC AUTOMATED: CPT

## 2022-02-23 PROCEDURE — 99214 OFFICE O/P EST MOD 30 MIN: CPT | Performed by: INTERNAL MEDICINE

## 2022-02-23 PROCEDURE — 85730 THROMBOPLASTIN TIME PARTIAL: CPT

## 2022-02-23 PROCEDURE — 36415 COLL VENOUS BLD VENIPUNCTURE: CPT

## 2022-02-23 PROCEDURE — 93005 ELECTROCARDIOGRAM TRACING: CPT | Performed by: SURGERY

## 2022-02-23 PROCEDURE — 99213 OFFICE O/P EST LOW 20 MIN: CPT | Performed by: SURGERY

## 2022-02-23 PROCEDURE — 85025 COMPLETE CBC W/AUTO DIFF WBC: CPT

## 2022-02-23 NOTE — PROGRESS NOTES
Department of Cardiovascular & Thoracic Surgery   Valve Clinic Note        Reason for Consult:  AS  PCP: Analy Engel MD      Date of Consult: 02/23/22      History Obtained From:  patient, electronic medical record    HISTORY OF PRESENT ILLNESS:    The patient is a  79 y.o. male with AS S/P TAVR. He has noted improvement of symptoms since the procedure. Feels much better. No episodes of heart racing or dyspnea. No fatigue. Noticed improved color. He is able to ambulate without dyspnea. Weight has been steady. BP starting climbing a little with decreased tenormin. Past Medical History:        Diagnosis Date    A-fib [facility])     Acute asthmatic bronchitis 1/4/2018    Acute MI ([facility]     CAD (coronary artery disease)     Colon polyp     Diverticulosis     Esophagitis     Hemochromatosis     Hiatal hernia     Hx of blood clots     Hyperlipidemia     Hypertension     Internal hemorrhoids     Mild intermittent asthma 3/29/2021    Mild persistent asthma without complication 1/89/0877    Phlebitis     Shortness of breath 1/4/2018    SVT (supraventricular tachycardia) (HCC)      Past Surgical History:        Procedure Laterality Date    COLONOSCOPY  7-28-14    CORONARY ARTERY BYPASS GRAFT      HERNIA REPAIR      PACEMAKER INSERTION      TOE SURGERY      TONSILLECTOMY      TOTAL KNEE ARTHROPLASTY      UPPER GASTROINTESTINAL ENDOSCOPY  7-28-14    VASECTOMY       Current Medications:   No current facility-administered medications on file prior to encounter.      Current Outpatient Medications on File Prior to Encounter   Medication Sig Dispense Refill    atenolol (TENORMIN) 50 MG tablet Take 1 tablet by mouth daily 30 tablet 3    aspirin 81 MG chewable tablet Take 81 mg by mouth daily      apixaban (ELIQUIS) 5 MG TABS tablet Take 5 mg by mouth 2 times daily      midodrine (PROAMATINE) 10 MG tablet Take 1 tablet by mouth 3 times daily (with meals) 90 tablet 3    cimetidine (TAGAMET) 200 MG tablet TAKE ONE TABLET BY MOUTH TWICE DAILY      traMADol (ULTRAM) 50 MG tablet TAKE ONE TABLET BY MOUTH EVERY SIX Hours AS NEEDED FOR pain      albuterol sulfate HFA (PROAIR HFA) 108 (90 Base) MCG/ACT inhaler Inhale 2 puffs into the lungs every 6 hours as needed for Wheezing 3 Inhaler 3    albuterol sulfate HFA (PROAIR HFA) 108 (90 Base) MCG/ACT inhaler Inhale 2 puffs into the lungs every 6 hours as needed for Wheezing 3 Inhaler 3    albuterol sulfate HFA (PROAIR HFA) 108 (90 Base) MCG/ACT inhaler Inhale 2 puffs into the lungs every 6 hours as needed for Wheezing 3 Inhaler 3    nystatin (MYCOSTATIN) 935045 UNIT/GM cream apply topically TO affected AREA TWICE DAILY  0    vitamin D (CHOLECALCIFEROL) 1000 UNIT TABS tablet Take 1,000 Units by mouth daily      ranitidine (ZANTAC) 150 MG tablet Take 2 tablets by mouth nightly 180 tablet 3    rosuvastatin (CRESTOR) 10 MG tablet Take 10 mg by mouth daily      acetaminophen (TYLENOL) 500 MG tablet Take 500 mg by mouth every 6 hours as needed for Pain      potassium citrate (UROCIT-K) 10 MEQ (1080 MG) SR tablet Take  by mouth 3 times daily (with meals).  finasteride (PROSCAR) 5 MG tablet Take 5 mg by mouth daily.  mometasone (NASONEX) 50 MCG/ACT nasal spray 2 sprays by Nasal route daily.  montelukast (SINGULAIR) 10 MG tablet Take 10 mg by mouth nightly.  levothyroxine (SYNTHROID) 100 MCG tablet Take 100 mcg by mouth Daily.  cetirizine (ZYRTEC) 10 MG tablet Take 10 mg by mouth daily.  sildenafil (VIAGRA) 100 MG tablet Take 100 mg by mouth       Allergies:     Allergies   Allergen Reactions    Lisinopril Anaphylaxis    Lopressor [Metoprolol] Swelling    Ciprofloxacin Nausea And Vomiting    Lorazepam Other (See Comments)     Hallucinations    Morphine Other (See Comments)     hallucinations    Pcn [Penicillins]     Vicodin [Hydrocodone-Acetaminophen]     Ambien [Zolpidem Tartrate] Nausea And Vomiting    Ativan [Lorazepam] Nausea And Vomiting    Avelox [Moxifloxacin] Nausea And Vomiting    Codeine Nausea And Vomiting         Social History:   Social History     Socioeconomic History    Marital status:      Spouse name: Not on file    Number of children: 2    Years of education: Not on file    Highest education level: Not on file   Occupational History    Not on file   Tobacco Use    Smoking status: Never Smoker    Smokeless tobacco: Never Used   Vaping Use    Vaping Use: Never used   Substance and Sexual Activity    Alcohol use: No    Drug use: No    Sexual activity: Not on file   Other Topics Concern    Not on file   Social History Narrative    Not on file     Social Determinants of Health     Financial Resource Strain:     Difficulty of Paying Living Expenses: Not on file   Food Insecurity:     Worried About Running Out of Food in the Last Year: Not on file    Jun of Food in the Last Year: Not on file   Transportation Needs:     Lack of Transportation (Medical): Not on file    Lack of Transportation (Non-Medical):  Not on file   Physical Activity:     Days of Exercise per Week: Not on file    Minutes of Exercise per Session: Not on file   Stress:     Feeling of Stress : Not on file   Social Connections:     Frequency of Communication with Friends and Family: Not on file    Frequency of Social Gatherings with Friends and Family: Not on file    Attends Taoism Services: Not on file    Active Member of [address] [place] [address] or Organizations: Not on file    Attends Club or Organization Meetings: Not on file    Marital Status: Not on file   Intimate Partner Violence:     Fear of Current or Ex-Partner: Not on file    Emotionally Abused: Not on file    Physically Abused: Not on file    Sexually Abused: Not on file   Housing Stability:     Unable to Pay for Housing in the Last Year: Not on file    Number of [place] in the Last Year: Not on file    Unstable Housing in the Last Year: Not on file Family History:  History reviewed. No pertinent family history. REVIEW OF SYSTEMS:   Complete ROS performed and negative except as noted in HPI. PHYSICAL EXAM:  Physical Exam  VITALS:  BP (!) 140/63   Pulse 63   Resp 14   Ht 5' 11\" (1.803 m)   Wt 144 lb (65.3 kg)   SpO2 99%   BMI 20.08 kg/m²   24HR INTAKE/OUTPUT:  No intake or output data in the 24 hours ending 02/23/22 1354    General appearance: No apparent distress, appears stated age and cooperative. Skin: unremarkable, warm and dry  HEENT Normocephalic, atraumatic without obvious deformity. Conjunctiva normal, EOMI, hearing intact  Neck: Supple, Trachea midline   Lungs: Good respiratory effort. Clear to auscultation, bilaterally  Heart: Regular rate/ rhythm, -murmur   Abdomen: Soft, non-tender or non-distended   Extremities: no edema warm well perfused, small groin site pulsatile hematoma is noticed,   Neurologic: Alert, grossly intact, no sensory deficit  Psych/Mental status: normal affect        DATA:  Ultrasound: small PSA (less than 2 cm)  CBC:   Recent Labs     02/23/22  0916   WBC 6.5   HGB 12.5*   HCT 40.1*        BMP:    Recent Labs     02/23/22  0916      K 4.0      CO2 22   BUN 31*   CREATININE 1.2   GLUCOSE 94     Hepatic:   Recent Labs     02/23/22  0916   AST 26   ALT 14   BILITOT 0.5   ALKPHOS 64     Mag:    No results for input(s): MG in the last 72 hours. Phos:   No results for input(s): PHOS in the last 72 hours. INR:   Recent Labs     02/23/22  0916   INR 1.21       IMPRESSION  Patient Active Problem List   Diagnosis    Ischemic chest pain (HCC)    Bradycardia    Shortness of breath    Acute asthmatic bronchitis    Left inguinal hernia    Mild intermittent asthma    Status post hemorrhoidectomy    Hypotension    Aortic stenosis, severe    S/P TAVR (transcatheter aortic valve replacement)             RECOMMENDATIONS:    Doing well S/P TAVR. Continue ASA. Increase tenormin to 100mg. Duplex reviewed. Small PSA noted. Will do compression for 20mins and follow up in 1 wk. RTC for 1 month visit. Thank you for the opportunity to assist in the care of this patient.

## 2022-02-23 NOTE — PROGRESS NOTES
Edgar Barrera RN applied manual pressure x20 minutes to right groin. Hemostasis obtained. Tegederm DSD applied to site.

## 2022-02-23 NOTE — PROGRESS NOTES
Right femoral dressing removed and 5 cm raised area noted. Pulse palpable noted. Dr. Cecilia Holt observed site and ultrasound ordered.    Dr. Noel Argueta at bedside

## 2022-02-23 NOTE — PROGRESS NOTES
CARDIOLOGY TAVR CLINIC   NOTE    Chief Complaint: POST TAVR    HPI:   Rocael Haines is a 79y.o. year old who has Past medical history as noted below. HE says he is breathing better , ankles is little swollen  He had TAVR 1 week ago. HE says he can make the bed without getting winded now. HE has not used his midodrone at all        No current facility-administered medications for this encounter.        Allergies:   Lisinopril, Lopressor [metoprolol], Ciprofloxacin, Lorazepam, Morphine, Pcn [penicillins], Vicodin [hydrocodone-acetaminophen], Ambien [zolpidem tartrate], Ativan [lorazepam], Avelox [moxifloxacin], and Codeine    Patient History:  Past Medical History:   Diagnosis Date    [facility]     Acute asthmatic bronchitis 1/4/2018    [facility]     CAD (coronary artery disease)     Colon polyp     Diverticulosis     Esophagitis     Hemochromatosis     Hiatal hernia     Hx of blood clots     Hyperlipidemia     Hypertension     Internal hemorrhoids     Mild intermittent asthma 3/29/2021    Mild persistent asthma without complication 1/06/9756    Phlebitis     Shortness of breath 1/4/2018    SVT (supraventricular tachycardia) (HCC)      Past Surgical History:   Procedure Laterality Date    COLONOSCOPY  7-28-14    CORONARY ARTERY BYPASS GRAFT      HERNIA REPAIR      PACEMAKER INSERTION      TOE SURGERY      TONSILLECTOMY      TOTAL KNEE ARTHROPLASTY      UPPER GASTROINTESTINAL ENDOSCOPY  7-28-14    VASECTOMY       Family History   Problem Relation Age of Onset    Heart Disease Father     Other Sister 27        celiac disease      Social History     Tobacco Use    Smoking status: Never Smoker    Smokeless tobacco: Never Used   Substance Use Topics    Alcohol use: No        Review of Systems:   · Constitutional: No Fever or Weight Loss   · Eyes: No Decreased Vision  · ENT: No Headaches, Hearing Loss or Vertigo  · Cardiovascular: as per note above · Respiratory: No cough or wheezing and as per note above. · Gastrointestinal: No abdominal pain, appetite loss, blood in stools, constipation, diarrhea or heartburn  · Genitourinary: No dysuria, trouble voiding, or hematuria  · Musculoskeletal:  denies any new  joint aches , swelling  or pain   · Integumentary: No rash or pruritis  · Neurological: No TIA or stroke symptoms  · Psychiatric: No anxiety or depression  · Endocrine: No malaise, fatigue or temperature intolerance  · Hematologic/Lymphatic: No bleeding problems, blood clots or swollen lymph nodes  · Allergic/Immunologic: No nasal congestion or hives    Objective:      Physical Exam:  BP (!) 140/63   Pulse 63   Resp 14   Ht 5' 11\" (1.803 m)   Wt 144 lb (65.3 kg)   SpO2 99%   BMI 20.08 kg/m²   Wt Readings from Last 3 Encounters:   02/23/22 144 lb (65.3 kg)   02/17/22 148 lb 2.4 oz (67.2 kg)   01/27/22 143 lb (64.9 kg)     Body mass index is 20.08 kg/m². Vitals:    02/23/22 1006   BP: (!) 140/63   Pulse: 63   Resp: 14   SpO2: 99%        General Appearance:  No distress, conversant  Constitutional:  Well developed, Well nourished, No acute distress, Non-toxic appearance. HENT:  Normocephalic, Atraumatic, Bilateral external ears normal, Oropharynx moist, No oral exudates, Nose normal. Neck- Normal range of motion, No tenderness, Supple, No stridor,no apical-carotid delay  Eyes:  PERRL, EOMI, Conjunctiva normal, No discharge. Respiratory:  Normal breath sounds, No respiratory distress, No wheezing, No chest tenderness. ,no use of accessory muscles, NO crackles  Cardiovascular: (PMI) right groin is bruised with large swelling , no thrill or bruit apex non displaced,no lifts no thrills,S1 and S2 audible, No added heart sounds, No signs of ankle edema, or volume overload, No evidence of JVD, No crackles  GI:  Bowel sounds normal, Soft, No tenderness, No masses, No gross visceromegaly   :  No costovertebral angle tenderness   Musculoskeletal:  No edema, no tenderness, no deformities. Back- no tenderness  Integument:  Well hydrated, no rash   Lymphatic:  No lymphadenopathy noted   Neurologic:  Alert & oriented x 3, CN 2-12 normal, normal motor function, normal sensory function, no focal deficits noted   Psychiatric:  Speech and behavior appropriate       Medical decision making and Data review:  DATA:  No results found for: TROPONINT  BNP:    Lab Results   Component Value Date    PROBNP 10,121 (H) 02/10/2022     PT/INR:  No results found for: PTINR  No results found for: LABA1C  Lab Results   Component Value Date    CHOL 127 02/27/2017    TRIG 101 02/27/2017    HDL 49 02/27/2017    LDLDIRECT 77 02/27/2017     Lab Results   Component Value Date    ALT 14 02/10/2022    AST 26 02/10/2022     Recent Labs     02/23/22  0916   WBC 6.5   HGB 12.5*   HCT 40.1*   MCV 97.1        TSH: No results found for: TSH  Lab Results   Component Value Date    AST 26 02/10/2022    ALT 14 02/10/2022    BILITOT 0.7 02/10/2022    ALKPHOS 63 02/10/2022     Lab Results   Component Value Date    PROBNP 10,121 (H) 02/10/2022    PROBNP 8,543 (H) 01/27/2022     No results found for: LABA1C  Lab Results   Component Value Date    WBC 6.5 02/23/2022    HGB 12.5 (L) 02/23/2022    HCT 40.1 (L) 02/23/2022     02/23/2022     All labs, medications and tests reviewed by myself including data and history from outside source , patient and available family .   Assessment & Plan:    POST TAVR    He does not need to take midodrone any more if BP is normal at home  HE can return to full activity  Refer to cardiac rehab  Get ultrasound right femoral to rule out aneurism/ bruit   Continue eliquis   Increase  atenolol 100 mg  No need to take midodrine if bp is well controlled  No need to take chlorthalidone  Refer to cardiac rehab

## 2022-02-23 NOTE — PROGRESS NOTES
Pt did determine that he is no longer taking Midodrine.    Atenolol in increase back to 100mg daily  No diuretic at this time  Cardiac rehab referral per Dr. Jarrett Hadley

## 2022-02-24 LAB
EKG ATRIAL RATE: 60 BPM
EKG DIAGNOSIS: NORMAL
EKG Q-T INTERVAL: 514 MS
EKG QRS DURATION: 228 MS
EKG QTC CALCULATION (BAZETT): 517 MS
EKG R AXIS: -57 DEGREES
EKG T AXIS: 103 DEGREES
EKG VENTRICULAR RATE: 61 BPM

## 2022-02-24 PROCEDURE — 93010 ELECTROCARDIOGRAM REPORT: CPT | Performed by: INTERNAL MEDICINE

## 2022-03-09 ENCOUNTER — HOSPITAL ENCOUNTER (OUTPATIENT)
Dept: CARDIAC REHAB | Age: 82
Setting detail: THERAPIES SERIES
Discharge: HOME | End: 2022-03-09
Payer: MEDICARE

## 2022-03-09 PROCEDURE — G0422 INTENS CARDIAC REHAB W/EXERC: HCPCS

## 2022-03-09 PROCEDURE — G0423 INTENS CARDIAC REHAB NO EXER: HCPCS

## 2022-03-14 ENCOUNTER — HOSPITAL ENCOUNTER (OUTPATIENT)
Dept: CARDIAC REHAB | Age: 82
Setting detail: THERAPIES SERIES
Discharge: HOME | End: 2022-03-14
Payer: MEDICARE

## 2022-03-14 PROCEDURE — G0423 INTENS CARDIAC REHAB NO EXER: HCPCS

## 2022-03-14 PROCEDURE — G0422 INTENS CARDIAC REHAB W/EXERC: HCPCS

## 2022-03-15 ENCOUNTER — HOSPITAL ENCOUNTER (OUTPATIENT)
Dept: CARDIAC REHAB | Age: 82
Setting detail: THERAPIES SERIES
Discharge: HOME | End: 2022-03-15
Payer: MEDICARE

## 2022-03-15 PROCEDURE — G0422 INTENS CARDIAC REHAB W/EXERC: HCPCS

## 2022-03-15 PROCEDURE — G0423 INTENS CARDIAC REHAB NO EXER: HCPCS

## 2022-03-16 ENCOUNTER — HOSPITAL ENCOUNTER (OUTPATIENT)
Age: 82
Discharge: HOME | End: 2022-03-16
Payer: MEDICARE

## 2022-03-16 ENCOUNTER — HOSPITAL ENCOUNTER (OUTPATIENT)
Dept: NON INVASIVE DIAGNOSTICS | Age: 82
Discharge: HOME | End: 2022-03-16
Payer: MEDICARE

## 2022-03-16 ENCOUNTER — HOSPITAL ENCOUNTER (OUTPATIENT)
Dept: CARDIAC CATH/INVASIVE PROCEDURES | Age: 82
Discharge: HOME | End: 2022-03-16
Attending: SURGERY | Admitting: SURGERY
Payer: MEDICARE

## 2022-03-16 VITALS
HEART RATE: 73 BPM | BODY MASS INDEX: 21.42 KG/M2 | RESPIRATION RATE: 14 BRPM | WEIGHT: 153 LBS | DIASTOLIC BLOOD PRESSURE: 84 MMHG | HEIGHT: 71 IN | OXYGEN SATURATION: 97 % | SYSTOLIC BLOOD PRESSURE: 158 MMHG

## 2022-03-16 LAB
ALBUMIN SERPL-MCNC: 4.2 GM/DL (ref 3.4–5)
ALP BLD-CCNC: 65 IU/L (ref 40–128)
ALT SERPL-CCNC: 14 U/L (ref 10–40)
ANION GAP SERPL CALCULATED.3IONS-SCNC: 11 MMOL/L (ref 4–16)
APTT: 44 SECONDS (ref 25.1–37.1)
AST SERPL-CCNC: 28 IU/L (ref 15–37)
BILIRUB SERPL-MCNC: 0.6 MG/DL (ref 0–1)
BUN BLDV-MCNC: 26 MG/DL (ref 6–23)
CALCIUM SERPL-MCNC: 9.6 MG/DL (ref 8.3–10.6)
CHLORIDE BLD-SCNC: 100 MMOL/L (ref 99–110)
CO2: 24 MMOL/L (ref 21–32)
CREAT SERPL-MCNC: 1.1 MG/DL (ref 0.9–1.3)
EKG ATRIAL RATE: 326 BPM
EKG DIAGNOSIS: NORMAL
EKG Q-T INTERVAL: 524 MS
EKG QRS DURATION: 234 MS
EKG QTC CALCULATION (BAZETT): 536 MS
EKG R AXIS: -57 DEGREES
EKG T AXIS: 92 DEGREES
EKG VENTRICULAR RATE: 63 BPM
GFR AFRICAN AMERICAN: >60 ML/MIN/1.73M2
GFR NON-AFRICAN AMERICAN: >60 ML/MIN/1.73M2
GLUCOSE BLD-MCNC: 87 MG/DL (ref 70–99)
HCT VFR BLD CALC: 42.8 % (ref 42–52)
HEMOGLOBIN: 13.6 GM/DL (ref 13.5–18)
INR BLD: 1.34 INDEX
LV EF: 43 %
LVEF MODALITY: NORMAL
MCH RBC QN AUTO: 30.2 PG (ref 27–31)
MCHC RBC AUTO-ENTMCNC: 31.8 % (ref 32–36)
MCV RBC AUTO: 94.9 FL (ref 78–100)
PDW BLD-RTO: 12.6 % (ref 11.7–14.9)
PLATELET # BLD: 165 K/CU MM (ref 140–440)
PMV BLD AUTO: 9.7 FL (ref 7.5–11.1)
POTASSIUM SERPL-SCNC: 4 MMOL/L (ref 3.5–5.1)
PRO-BNP: 5049 PG/ML
PROTHROMBIN TIME: 17.4 SECONDS (ref 11.7–14.5)
RBC # BLD: 4.51 M/CU MM (ref 4.6–6.2)
SODIUM BLD-SCNC: 135 MMOL/L (ref 135–145)
TOTAL PROTEIN: 7.2 GM/DL (ref 6.4–8.2)
WBC # BLD: 6.6 K/CU MM (ref 4–10.5)

## 2022-03-16 PROCEDURE — 85610 PROTHROMBIN TIME: CPT

## 2022-03-16 PROCEDURE — 93306 TTE W/DOPPLER COMPLETE: CPT

## 2022-03-16 PROCEDURE — 85027 COMPLETE CBC AUTOMATED: CPT

## 2022-03-16 PROCEDURE — 93010 ELECTROCARDIOGRAM REPORT: CPT | Performed by: INTERNAL MEDICINE

## 2022-03-16 PROCEDURE — 93005 ELECTROCARDIOGRAM TRACING: CPT | Performed by: SURGERY

## 2022-03-16 PROCEDURE — 85730 THROMBOPLASTIN TIME PARTIAL: CPT

## 2022-03-16 PROCEDURE — 80053 COMPREHEN METABOLIC PANEL: CPT

## 2022-03-16 PROCEDURE — 36415 COLL VENOUS BLD VENIPUNCTURE: CPT

## 2022-03-16 PROCEDURE — 83880 ASSAY OF NATRIURETIC PEPTIDE: CPT

## 2022-03-16 NOTE — PROGRESS NOTES
by mouth 3 times daily (with meals).  finasteride (PROSCAR) 5 MG tablet Take 5 mg by mouth daily.  mometasone (NASONEX) 50 MCG/ACT nasal spray 2 sprays by Nasal route daily.  montelukast (SINGULAIR) 10 MG tablet Take 10 mg by mouth nightly.  levothyroxine (SYNTHROID) 100 MCG tablet Take 100 mcg by mouth Daily.  cetirizine (ZYRTEC) 10 MG tablet Take 10 mg by mouth daily.          Allergies:   Lisinopril, Lopressor [metoprolol], Ciprofloxacin, Lorazepam, Morphine, Pcn [penicillins], Vicodin [hydrocodone-acetaminophen], Ambien [zolpidem tartrate], Ativan [lorazepam], Avelox [moxifloxacin], and Codeine    Patient History:  Past Medical History:   Diagnosis Date    [facility] ([facility]     Acute asthmatic bronchitis 1/4/2018    Acute MI ([facility]     CAD (coronary artery disease)     Colon polyp     Diverticulosis     Esophagitis     Hemochromatosis     Hiatal hernia     Hx of blood clots     Hyperlipidemia     Hypertension     Internal hemorrhoids     Mild intermittent asthma 3/29/2021    Mild persistent asthma without complication 5/87/3084    Phlebitis     Shortness of breath 1/4/2018    SVT (supraventricular tachycardia) (HCC)      Past Surgical History:   Procedure Laterality Date    COLONOSCOPY  7-28-14    CORONARY ARTERY BYPASS GRAFT      HERNIA REPAIR      PACEMAKER INSERTION      TOE SURGERY      TONSILLECTOMY      TOTAL KNEE ARTHROPLASTY      UPPER GASTROINTESTINAL ENDOSCOPY  7-28-14    VASECTOMY       Family History   Problem Relation Age of Onset    Heart Disease Father     Other Sister 27        celiac disease      Social History     Tobacco Use    Smoking status: Never Smoker    Smokeless tobacco: Never Used   Substance Use Topics    Alcohol use: No        Review of Systems:   · Constitutional: No Fever or Weight Loss   · Eyes: No Decreased Vision  · ENT: No Headaches, Hearing Loss or Vertigo  · Cardiovascular: as per note above   · Respiratory: No cough or wheezing and as per note above. · Gastrointestinal: No abdominal pain, appetite loss, blood in stools, constipation, diarrhea or heartburn  · Genitourinary: No dysuria, trouble voiding, or hematuria  · Musculoskeletal:  denies any new  joint aches , swelling  or pain   · Integumentary: No rash or pruritis  · Neurological: No TIA or stroke symptoms  · Psychiatric: No anxiety or depression  · Endocrine: No malaise, fatigue or temperature intolerance  · Hematologic/Lymphatic: No bleeding problems, blood clots or swollen lymph nodes  · Allergic/Immunologic: No nasal congestion or hives    Objective:      Physical Exam:  BP (!) 158/84   Pulse 73   Resp 14   Ht 5' 11\" (1.803 m)   Wt 153 lb (69.4 kg)   SpO2 97%   BMI 21.34 kg/m²   Wt Readings from Last 3 Encounters:   03/16/22 153 lb (69.4 kg)   03/03/22 153 lb (69.4 kg)   02/23/22 144 lb (65.3 kg)     Body mass index is 21.34 kg/m². Vitals:    03/16/22 0952   BP: (!) 158/84   Pulse: 73   Resp: 14   SpO2: 97%        General Appearance:  No distress, conversant  Constitutional:  Well developed, Well nourished, No acute distress, Non-toxic appearance. HENT:  Normocephalic, Atraumatic, Bilateral external ears normal, Oropharynx moist, No oral exudates, Nose normal. Neck- Normal range of motion, No tenderness, Supple, No stridor,no apical-carotid delay  Eyes:  PERRL, EOMI, Conjunctiva normal, No discharge. Respiratory:  Normal breath sounds, No respiratory distress, No wheezing, No chest tenderness. ,no use of accessory muscles, NO crackles  Cardiovascular: (PMI) right groin is bruised with large swelling , no thrill or bruit apex non displaced,no lifts no thrills,S1 and S2 audible, No added heart sounds, No signs of ankle edema, or volume overload, No evidence of JVD, No crackles  GI:  Bowel sounds normal, Soft, No tenderness, No masses, No gross visceromegaly   :  No costovertebral angle tenderness   Musculoskeletal:  No edema, no tenderness, no deformities. Back- no tenderness  Integument:  Well hydrated, no rash   Lymphatic:  No lymphadenopathy noted   Neurologic:  Alert & oriented x 3, CN 2-12 normal, normal motor function, normal sensory function, no focal deficits noted   Psychiatric:  Speech and behavior appropriate       Medical decision making and Data review:  DATA:  No results found for: TROPONINT  BNP:    Lab Results   Component Value Date    PROBNP 7,540 (H) 02/23/2022     PT/INR:  No results found for: PTINR  No results found for: LABA1C  Lab Results   Component Value Date    CHOL 127 02/27/2017    TRIG 101 02/27/2017    HDL 49 02/27/2017    LDLDIRECT 77 02/27/2017     Lab Results   Component Value Date    ALT 14 02/23/2022    AST 26 02/23/2022     Recent Labs     03/16/22  0923   WBC 6.6   HGB 13.6   HCT 42.8   MCV 94.9        TSH: No results found for: TSH  Lab Results   Component Value Date    AST 26 02/23/2022    ALT 14 02/23/2022    BILITOT 0.5 02/23/2022    ALKPHOS 64 02/23/2022     Lab Results   Component Value Date    PROBNP 7,540 (H) 02/23/2022    PROBNP 10,121 (H) 02/10/2022    PROBNP 8,543 (H) 01/27/2022     No results found for: LABA1C  Lab Results   Component Value Date    WBC 6.6 03/16/2022    HGB 13.6 03/16/2022    HCT 42.8 03/16/2022     03/16/2022     All labs, medications and tests reviewed by myself including data and history from outside source , patient and available family .   Assessment & Plan:    POST TAVR    Get echo   HE can return to full activity, right femoral pseudoaneurysm, repeat ultrasound intervene as per surgery   Refer to cardiac rehab  Continue eliquis     atenolol 100 mg  No need to take midodrine if bp is well controlled  No need to take chlorthalidone  Refer to cardiac rehab

## 2022-03-17 ENCOUNTER — HOSPITAL ENCOUNTER (OUTPATIENT)
Dept: CARDIAC REHAB | Age: 82
Setting detail: THERAPIES SERIES
Discharge: HOME | End: 2022-03-17
Payer: MEDICARE

## 2022-03-17 PROCEDURE — G0423 INTENS CARDIAC REHAB NO EXER: HCPCS

## 2022-03-17 PROCEDURE — G0422 INTENS CARDIAC REHAB W/EXERC: HCPCS

## 2022-03-21 ENCOUNTER — HOSPITAL ENCOUNTER (OUTPATIENT)
Dept: CARDIAC REHAB | Age: 82
Setting detail: THERAPIES SERIES
Discharge: HOME | End: 2022-03-21
Payer: MEDICARE

## 2022-03-21 PROCEDURE — G0423 INTENS CARDIAC REHAB NO EXER: HCPCS

## 2022-03-21 PROCEDURE — G0422 INTENS CARDIAC REHAB W/EXERC: HCPCS

## 2022-03-22 ENCOUNTER — HOSPITAL ENCOUNTER (OUTPATIENT)
Dept: CARDIAC REHAB | Age: 82
Setting detail: THERAPIES SERIES
Discharge: HOME | End: 2022-03-22
Payer: MEDICARE

## 2022-03-22 PROCEDURE — G0423 INTENS CARDIAC REHAB NO EXER: HCPCS

## 2022-03-22 PROCEDURE — G0422 INTENS CARDIAC REHAB W/EXERC: HCPCS

## 2022-03-24 ENCOUNTER — HOSPITAL ENCOUNTER (OUTPATIENT)
Dept: CARDIAC REHAB | Age: 82
Setting detail: THERAPIES SERIES
Discharge: HOME | End: 2022-03-24
Payer: MEDICARE

## 2022-03-24 PROCEDURE — G0422 INTENS CARDIAC REHAB W/EXERC: HCPCS

## 2022-03-24 PROCEDURE — G0423 INTENS CARDIAC REHAB NO EXER: HCPCS

## 2022-03-28 ENCOUNTER — HOSPITAL ENCOUNTER (OUTPATIENT)
Dept: CARDIAC REHAB | Age: 82
Setting detail: THERAPIES SERIES
Discharge: HOME | End: 2022-03-28
Payer: MEDICARE

## 2022-03-28 PROCEDURE — G0423 INTENS CARDIAC REHAB NO EXER: HCPCS

## 2022-03-28 PROCEDURE — G0422 INTENS CARDIAC REHAB W/EXERC: HCPCS

## 2022-03-29 ENCOUNTER — HOSPITAL ENCOUNTER (OUTPATIENT)
Dept: CARDIAC REHAB | Age: 82
Setting detail: THERAPIES SERIES
Discharge: HOME | End: 2022-03-29
Payer: MEDICARE

## 2022-03-29 PROCEDURE — G0423 INTENS CARDIAC REHAB NO EXER: HCPCS

## 2022-03-29 PROCEDURE — G0422 INTENS CARDIAC REHAB W/EXERC: HCPCS

## 2022-03-30 PROBLEM — I72.9 PSEUDOANEURYSM (HCC): Status: ACTIVE | Noted: 2022-03-30

## 2022-04-04 ENCOUNTER — HOSPITAL ENCOUNTER (OUTPATIENT)
Dept: CARDIAC REHAB | Age: 82
Setting detail: THERAPIES SERIES
Discharge: HOME | End: 2022-04-04
Payer: MEDICARE

## 2022-04-04 PROCEDURE — G0423 INTENS CARDIAC REHAB NO EXER: HCPCS

## 2022-04-04 PROCEDURE — G0422 INTENS CARDIAC REHAB W/EXERC: HCPCS

## 2022-04-05 ENCOUNTER — HOSPITAL ENCOUNTER (OUTPATIENT)
Dept: CARDIAC REHAB | Age: 82
Setting detail: THERAPIES SERIES
Discharge: HOME | End: 2022-04-05
Payer: MEDICARE

## 2022-04-05 PROCEDURE — G0423 INTENS CARDIAC REHAB NO EXER: HCPCS

## 2022-04-05 PROCEDURE — G0422 INTENS CARDIAC REHAB W/EXERC: HCPCS

## 2022-04-07 ENCOUNTER — HOSPITAL ENCOUNTER (OUTPATIENT)
Dept: CARDIAC REHAB | Age: 82
Setting detail: THERAPIES SERIES
Discharge: HOME | End: 2022-04-07
Payer: MEDICARE

## 2022-04-07 PROCEDURE — G0422 INTENS CARDIAC REHAB W/EXERC: HCPCS

## 2022-04-07 PROCEDURE — G0423 INTENS CARDIAC REHAB NO EXER: HCPCS

## 2022-04-11 ENCOUNTER — HOSPITAL ENCOUNTER (OUTPATIENT)
Dept: CARDIAC REHAB | Age: 82
Setting detail: THERAPIES SERIES
Discharge: HOME | End: 2022-04-11
Payer: MEDICARE

## 2022-04-11 PROCEDURE — G0423 INTENS CARDIAC REHAB NO EXER: HCPCS

## 2022-04-11 PROCEDURE — G0422 INTENS CARDIAC REHAB W/EXERC: HCPCS

## 2022-04-12 ENCOUNTER — HOSPITAL ENCOUNTER (OUTPATIENT)
Dept: CARDIAC REHAB | Age: 82
Setting detail: THERAPIES SERIES
Discharge: HOME | End: 2022-04-12
Payer: MEDICARE

## 2022-04-12 PROCEDURE — G0423 INTENS CARDIAC REHAB NO EXER: HCPCS

## 2022-04-12 PROCEDURE — G0422 INTENS CARDIAC REHAB W/EXERC: HCPCS

## 2022-04-14 ENCOUNTER — HOSPITAL ENCOUNTER (OUTPATIENT)
Dept: CARDIAC REHAB | Age: 82
Setting detail: THERAPIES SERIES
Discharge: HOME | End: 2022-04-14
Payer: MEDICARE

## 2022-04-14 PROCEDURE — G0422 INTENS CARDIAC REHAB W/EXERC: HCPCS

## 2022-04-14 PROCEDURE — G0423 INTENS CARDIAC REHAB NO EXER: HCPCS

## 2022-04-18 ENCOUNTER — HOSPITAL ENCOUNTER (OUTPATIENT)
Dept: CARDIAC REHAB | Age: 82
Setting detail: THERAPIES SERIES
Discharge: HOME | End: 2022-04-18
Payer: MEDICARE

## 2022-04-18 PROCEDURE — G0422 INTENS CARDIAC REHAB W/EXERC: HCPCS

## 2022-04-18 PROCEDURE — G0423 INTENS CARDIAC REHAB NO EXER: HCPCS

## 2022-04-19 ENCOUNTER — HOSPITAL ENCOUNTER (OUTPATIENT)
Dept: CARDIAC REHAB | Age: 82
Setting detail: THERAPIES SERIES
Discharge: HOME | End: 2022-04-19
Payer: MEDICARE

## 2022-04-19 PROCEDURE — G0422 INTENS CARDIAC REHAB W/EXERC: HCPCS

## 2022-04-19 PROCEDURE — G0423 INTENS CARDIAC REHAB NO EXER: HCPCS

## 2022-04-21 ENCOUNTER — HOSPITAL ENCOUNTER (OUTPATIENT)
Dept: CARDIAC REHAB | Age: 82
Setting detail: THERAPIES SERIES
Discharge: HOME | End: 2022-04-21
Payer: MEDICARE

## 2022-04-21 PROCEDURE — G0422 INTENS CARDIAC REHAB W/EXERC: HCPCS

## 2022-04-21 PROCEDURE — G0423 INTENS CARDIAC REHAB NO EXER: HCPCS

## 2022-04-25 ENCOUNTER — HOSPITAL ENCOUNTER (OUTPATIENT)
Dept: CARDIAC REHAB | Age: 82
Setting detail: THERAPIES SERIES
Discharge: HOME | End: 2022-04-25
Payer: MEDICARE

## 2022-04-25 PROCEDURE — G0422 INTENS CARDIAC REHAB W/EXERC: HCPCS

## 2022-04-25 PROCEDURE — G0423 INTENS CARDIAC REHAB NO EXER: HCPCS

## 2022-04-26 ENCOUNTER — HOSPITAL ENCOUNTER (OUTPATIENT)
Dept: CARDIAC REHAB | Age: 82
Setting detail: THERAPIES SERIES
Discharge: HOME | End: 2022-04-26
Payer: MEDICARE

## 2022-04-26 PROCEDURE — G0423 INTENS CARDIAC REHAB NO EXER: HCPCS

## 2022-04-26 PROCEDURE — G0422 INTENS CARDIAC REHAB W/EXERC: HCPCS

## 2022-04-28 ENCOUNTER — HOSPITAL ENCOUNTER (OUTPATIENT)
Dept: CARDIAC REHAB | Age: 82
Setting detail: THERAPIES SERIES
Discharge: HOME | End: 2022-04-28
Payer: MEDICARE

## 2022-04-28 PROCEDURE — G0422 INTENS CARDIAC REHAB W/EXERC: HCPCS

## 2022-04-28 PROCEDURE — G0423 INTENS CARDIAC REHAB NO EXER: HCPCS

## 2022-04-29 NOTE — PROGRESS NOTES
Cardiac Rehab Outpatient Medical Nutrition Therapy   04/28/2022  14:56-15:58    Client History:    Pertinent medications:   Current Outpatient Medications   Medication Instructions    acetaminophen (TYLENOL) 500 mg, Oral, EVERY 6 HOURS PRN    albuterol sulfate HFA (PROAIR HFA) 108 (90 Base) MCG/ACT inhaler 2 puffs, Inhalation, EVERY 6 HOURS PRN    albuterol sulfate HFA (PROAIR HFA) 108 (90 Base) MCG/ACT inhaler 2 puffs, Inhalation, EVERY 6 HOURS PRN    albuterol sulfate HFA (PROAIR HFA) 108 (90 Base) MCG/ACT inhaler 2 puffs, Inhalation, EVERY 6 HOURS PRN    apixaban (ELIQUIS) 5 mg, Oral, 2 TIMES DAILY    aspirin 81 mg, Oral, DAILY    atenolol (TENORMIN) 100 mg, Oral, DAILY    cetirizine (ZYRTEC) 10 mg, DAILY    cimetidine (TAGAMET) 200 MG tablet TAKE ONE TABLET BY MOUTH TWICE DAILY    finasteride (PROSCAR) 5 mg, DAILY    levothyroxine (SYNTHROID) 100 mcg, DAILY    mometasone (NASONEX) 50 MCG/ACT nasal spray 2 sprays, DAILY    montelukast (SINGULAIR) 10 mg, NIGHTLY    nystatin (MYCOSTATIN) 631314 UNIT/GM cream apply topically TO affected AREA TWICE DAILY    potassium citrate (UROCIT-K) 10 MEQ (1080 MG) SR tablet 3 TIMES DAILY WITH MEALS    raNITIdine (ZANTAC) 300 mg, Oral, NIGHTLY    rosuvastatin (CRESTOR) 10 mg, Oral, DAILY    sildenafil (VIAGRA) 100 mg, Oral    traMADol (ULTRAM) 50 MG tablet TAKE ONE TABLET BY MOUTH EVERY SIX Hours AS NEEDED FOR pain    vitamin D (CHOLECALCIFEROL) 1,000 Units, Oral, DAILY        Social hx: Lives with spouse. Retired from [employer] and the worked in [place] for more than 10 years. Currently doing most grocery shopping and cooking as spouse recovers from knee surgery. Pertinent Medical hx: CAD, MI, CABG, cardiac stenting      Activity habits: Likes to stay active. Currently exercise in cardiac rehab program.      Food/nutrition habits: Eats 3 meals each day. Lunch meals, frozen meals Healthy Choice usually.  Breakfast meals-oatmeal with berries, cereal with toast, breakfast sandwich. Likes fish-gets fresh/frozen from [place], halibut, cod, salmon. Uses 90-96% lean beef. Prefers turkey, not much chicken. Dines out 2 days/week. Biochemical data: no recent lipid labs     Anthropometrics:    Ht: 71\"  Wt: 155#   IBW: 155-172#    % of IBW: 100            BMI: 21.67, normal but lower for age over 72    Weight hx: stable weight over adult life     Diet hx: has attended cardiac rehab program before, currently forth time in program. During [employer] [facility] [employer] [facility] career instructed on and followed low sodium diet. Estimated needs: 8956-3314 calories/day (25-30 saul/kg current weight)     Nutrition dx: none at this time     Nutrition Prescription:  Consistent meals with 3 per day, low sodium food choices 1500 mg/day    Nutrition Interventions: Discussion on current food choices. Some differences in meals recently with spouse in hospital for knee surgery. Generally consumes lean proteins with fruits and vegetables. Enjoys making salads and usually eats daily. Patient content with current meal plan, has knowledge of cardiac diet from previous cardiac rehab.      Nutrition related goals: continue to consume 3 meals each day, monitor sodium content-read food labels     Adherence/barriers to goals: no barriers at this time     Primary learner: attended alone   Education materials provided: [employer] shopping list   Method of education:   Explanation    Handouts   Teach back     Response to education:    Verbalized understanding            Rec/plan: Continue to attend cardiac rehab program

## 2022-05-02 ENCOUNTER — HOSPITAL ENCOUNTER (OUTPATIENT)
Dept: CARDIAC REHAB | Age: 82
Setting detail: THERAPIES SERIES
Discharge: HOME | End: 2022-05-02
Payer: MEDICARE

## 2022-05-02 PROCEDURE — G0422 INTENS CARDIAC REHAB W/EXERC: HCPCS

## 2022-05-02 PROCEDURE — G0423 INTENS CARDIAC REHAB NO EXER: HCPCS

## 2022-05-03 ENCOUNTER — HOSPITAL ENCOUNTER (OUTPATIENT)
Dept: CARDIAC REHAB | Age: 82
Setting detail: THERAPIES SERIES
Discharge: HOME | End: 2022-05-03
Payer: MEDICARE

## 2022-05-03 PROCEDURE — G0423 INTENS CARDIAC REHAB NO EXER: HCPCS

## 2022-05-03 PROCEDURE — G0422 INTENS CARDIAC REHAB W/EXERC: HCPCS

## 2022-05-05 ENCOUNTER — HOSPITAL ENCOUNTER (OUTPATIENT)
Dept: CARDIAC REHAB | Age: 82
Setting detail: THERAPIES SERIES
Discharge: HOME | End: 2022-05-05
Payer: MEDICARE

## 2022-05-05 PROCEDURE — G0423 INTENS CARDIAC REHAB NO EXER: HCPCS

## 2022-05-05 PROCEDURE — G0422 INTENS CARDIAC REHAB W/EXERC: HCPCS

## 2022-05-09 ENCOUNTER — HOSPITAL ENCOUNTER (OUTPATIENT)
Dept: CARDIAC REHAB | Age: 82
Setting detail: THERAPIES SERIES
Discharge: HOME | End: 2022-05-09
Payer: MEDICARE

## 2022-05-09 PROCEDURE — G0423 INTENS CARDIAC REHAB NO EXER: HCPCS

## 2022-05-09 PROCEDURE — G0422 INTENS CARDIAC REHAB W/EXERC: HCPCS

## 2022-05-10 ENCOUNTER — HOSPITAL ENCOUNTER (OUTPATIENT)
Dept: CARDIAC REHAB | Age: 82
Setting detail: THERAPIES SERIES
Discharge: HOME | End: 2022-05-10
Payer: MEDICARE

## 2022-05-10 PROCEDURE — G0422 INTENS CARDIAC REHAB W/EXERC: HCPCS

## 2022-05-10 PROCEDURE — G0423 INTENS CARDIAC REHAB NO EXER: HCPCS

## 2022-05-12 ENCOUNTER — HOSPITAL ENCOUNTER (OUTPATIENT)
Dept: CARDIAC REHAB | Age: 82
Setting detail: THERAPIES SERIES
Discharge: HOME | End: 2022-05-12
Payer: MEDICARE

## 2022-05-12 PROCEDURE — G0422 INTENS CARDIAC REHAB W/EXERC: HCPCS

## 2022-05-12 PROCEDURE — G0423 INTENS CARDIAC REHAB NO EXER: HCPCS

## 2022-05-16 ENCOUNTER — HOSPITAL ENCOUNTER (OUTPATIENT)
Dept: CARDIAC REHAB | Age: 82
Setting detail: THERAPIES SERIES
Discharge: HOME | End: 2022-05-16
Payer: MEDICARE

## 2022-05-16 PROCEDURE — G0423 INTENS CARDIAC REHAB NO EXER: HCPCS

## 2022-05-16 PROCEDURE — G0422 INTENS CARDIAC REHAB W/EXERC: HCPCS

## 2022-05-17 ENCOUNTER — HOSPITAL ENCOUNTER (OUTPATIENT)
Dept: CARDIAC REHAB | Age: 82
Setting detail: THERAPIES SERIES
Discharge: HOME | End: 2022-05-17
Payer: MEDICARE

## 2022-05-17 PROCEDURE — G0422 INTENS CARDIAC REHAB W/EXERC: HCPCS

## 2022-05-17 PROCEDURE — G0423 INTENS CARDIAC REHAB NO EXER: HCPCS

## 2022-05-19 ENCOUNTER — HOSPITAL ENCOUNTER (OUTPATIENT)
Dept: CARDIAC REHAB | Age: 82
Setting detail: THERAPIES SERIES
Discharge: HOME | End: 2022-05-19
Payer: MEDICARE

## 2022-05-19 PROCEDURE — G0423 INTENS CARDIAC REHAB NO EXER: HCPCS

## 2022-05-19 PROCEDURE — G0422 INTENS CARDIAC REHAB W/EXERC: HCPCS

## 2022-05-23 ENCOUNTER — HOSPITAL ENCOUNTER (OUTPATIENT)
Dept: CARDIAC REHAB | Age: 82
Setting detail: THERAPIES SERIES
Discharge: HOME | End: 2022-05-23
Payer: MEDICARE

## 2022-05-23 PROCEDURE — G0423 INTENS CARDIAC REHAB NO EXER: HCPCS

## 2022-05-23 PROCEDURE — G0422 INTENS CARDIAC REHAB W/EXERC: HCPCS

## 2022-05-24 ENCOUNTER — HOSPITAL ENCOUNTER (OUTPATIENT)
Dept: CARDIAC REHAB | Age: 82
Setting detail: THERAPIES SERIES
Discharge: HOME | End: 2022-05-24
Payer: MEDICARE

## 2022-05-24 PROCEDURE — G0423 INTENS CARDIAC REHAB NO EXER: HCPCS

## 2022-05-24 PROCEDURE — G0422 INTENS CARDIAC REHAB W/EXERC: HCPCS

## 2022-05-24 RX ORDER — ALBUTEROL SULFATE 90 UG/1
2 AEROSOL, METERED RESPIRATORY (INHALATION) EVERY 6 HOURS PRN
Qty: 3 EACH | Refills: 4 | Status: SHIPPED | OUTPATIENT
Start: 2022-05-24

## 2022-05-24 NOTE — TELEPHONE ENCOUNTER
Patient came into office today requesting a refill of his albuterol. Needs it printed.     [phone]

## 2022-05-26 ENCOUNTER — HOSPITAL ENCOUNTER (OUTPATIENT)
Dept: CARDIAC REHAB | Age: 82
Setting detail: THERAPIES SERIES
Discharge: HOME | End: 2022-05-26
Payer: MEDICARE

## 2022-05-26 PROCEDURE — G0422 INTENS CARDIAC REHAB W/EXERC: HCPCS

## 2022-05-26 PROCEDURE — G0423 INTENS CARDIAC REHAB NO EXER: HCPCS

## 2022-05-31 ENCOUNTER — HOSPITAL ENCOUNTER (OUTPATIENT)
Dept: CARDIAC REHAB | Age: 82
Setting detail: THERAPIES SERIES
Discharge: HOME | End: 2022-05-31
Payer: MEDICARE

## 2022-05-31 PROCEDURE — G0423 INTENS CARDIAC REHAB NO EXER: HCPCS

## 2022-05-31 PROCEDURE — G0422 INTENS CARDIAC REHAB W/EXERC: HCPCS

## 2022-06-02 ENCOUNTER — HOSPITAL ENCOUNTER (OUTPATIENT)
Dept: CARDIAC REHAB | Age: 82
Setting detail: THERAPIES SERIES
Discharge: HOME | End: 2022-06-02
Payer: MEDICARE

## 2022-06-02 PROCEDURE — G0423 INTENS CARDIAC REHAB NO EXER: HCPCS

## 2022-06-02 PROCEDURE — G0422 INTENS CARDIAC REHAB W/EXERC: HCPCS

## 2022-06-06 ENCOUNTER — HOSPITAL ENCOUNTER (OUTPATIENT)
Dept: CARDIAC REHAB | Age: 82
Setting detail: THERAPIES SERIES
Discharge: HOME | End: 2022-06-06
Payer: MEDICARE

## 2022-06-06 PROCEDURE — G0423 INTENS CARDIAC REHAB NO EXER: HCPCS

## 2022-06-06 PROCEDURE — G0422 INTENS CARDIAC REHAB W/EXERC: HCPCS
